# Patient Record
Sex: FEMALE | Race: OTHER
[De-identification: names, ages, dates, MRNs, and addresses within clinical notes are randomized per-mention and may not be internally consistent; named-entity substitution may affect disease eponyms.]

---

## 2017-01-06 ENCOUNTER — RECORD ABSTRACTING (OUTPATIENT)
Age: 37
End: 2017-01-06

## 2017-01-06 DIAGNOSIS — Z78.9 OTHER SPECIFIED HEALTH STATUS: ICD-10-CM

## 2017-04-12 ENCOUNTER — APPOINTMENT (OUTPATIENT)
Dept: OBGYN | Facility: CLINIC | Age: 37
End: 2017-04-12

## 2017-06-22 ENCOUNTER — EMERGENCY (EMERGENCY)
Facility: HOSPITAL | Age: 37
LOS: 0 days | Discharge: HOME | End: 2017-06-22

## 2017-06-22 DIAGNOSIS — O99.89 OTHER SPECIFIED DISEASES AND CONDITIONS COMPLICATING PREGNANCY, CHILDBIRTH AND THE PUERPERIUM: ICD-10-CM

## 2017-07-27 DIAGNOSIS — Z33.2 ENCOUNTER FOR ELECTIVE TERMINATION OF PREGNANCY: ICD-10-CM

## 2018-08-29 ENCOUNTER — APPOINTMENT (OUTPATIENT)
Dept: ENDOCRINOLOGY | Facility: CLINIC | Age: 38
End: 2018-08-29

## 2021-02-23 ENCOUNTER — NON-APPOINTMENT (OUTPATIENT)
Age: 41
End: 2021-02-23

## 2021-02-25 ENCOUNTER — OUTPATIENT (OUTPATIENT)
Dept: OUTPATIENT SERVICES | Facility: HOSPITAL | Age: 41
LOS: 1 days | Discharge: HOME | End: 2021-02-25

## 2021-02-25 ENCOUNTER — NON-APPOINTMENT (OUTPATIENT)
Age: 41
End: 2021-02-25

## 2021-02-25 ENCOUNTER — RESULT CHARGE (OUTPATIENT)
Age: 41
End: 2021-02-25

## 2021-02-25 ENCOUNTER — APPOINTMENT (OUTPATIENT)
Dept: OBGYN | Facility: CLINIC | Age: 41
End: 2021-02-25

## 2021-02-25 ENCOUNTER — APPOINTMENT (OUTPATIENT)
Dept: OBGYN | Facility: CLINIC | Age: 41
End: 2021-02-25
Payer: MEDICAID

## 2021-02-25 VITALS — DIASTOLIC BLOOD PRESSURE: 90 MMHG | WEIGHT: 151 LBS | SYSTOLIC BLOOD PRESSURE: 146 MMHG

## 2021-02-25 VITALS — HEIGHT: 63 IN | WEIGHT: 151 LBS | BODY MASS INDEX: 26.75 KG/M2

## 2021-02-25 LAB
BILIRUB UR QL STRIP: NORMAL
CLARITY UR: CLEAR
COLLECTION METHOD: NORMAL
GLUCOSE UR-MCNC: NORMAL
HCG UR QL: 0.2 EU/DL
HGB UR QL STRIP.AUTO: NORMAL
KETONES UR-MCNC: NORMAL
LEUKOCYTE ESTERASE UR QL STRIP: NORMAL
NITRITE UR QL STRIP: NORMAL
PH UR STRIP: 6
PROT UR STRIP-MCNC: NORMAL
SP GR UR STRIP: 1.02

## 2021-02-25 PROCEDURE — 76815 OB US LIMITED FETUS(S): CPT | Mod: 26

## 2021-02-25 PROCEDURE — 99204 OFFICE O/P NEW MOD 45 MIN: CPT | Mod: 25

## 2021-03-01 LAB
C TRACH RRNA SPEC QL NAA+PROBE: NOT DETECTED
N GONORRHOEA RRNA SPEC QL NAA+PROBE: NOT DETECTED
SOURCE AMPLIFICATION: NORMAL

## 2021-03-05 DIAGNOSIS — Z34.90 ENCOUNTER FOR SUPERVISION OF NORMAL PREGNANCY, UNSPECIFIED, UNSPECIFIED TRIMESTER: ICD-10-CM

## 2021-03-23 ENCOUNTER — NON-APPOINTMENT (OUTPATIENT)
Age: 41
End: 2021-03-23

## 2021-03-25 ENCOUNTER — NON-APPOINTMENT (OUTPATIENT)
Age: 41
End: 2021-03-25

## 2021-03-25 ENCOUNTER — RESULT CHARGE (OUTPATIENT)
Age: 41
End: 2021-03-25

## 2021-03-25 ENCOUNTER — OUTPATIENT (OUTPATIENT)
Dept: OUTPATIENT SERVICES | Facility: HOSPITAL | Age: 41
LOS: 1 days | Discharge: HOME | End: 2021-03-25

## 2021-03-25 ENCOUNTER — APPOINTMENT (OUTPATIENT)
Dept: OBGYN | Facility: CLINIC | Age: 41
End: 2021-03-25
Payer: MEDICAID

## 2021-03-25 VITALS — WEIGHT: 153 LBS | SYSTOLIC BLOOD PRESSURE: 136 MMHG | DIASTOLIC BLOOD PRESSURE: 84 MMHG | BODY MASS INDEX: 27.1 KG/M2

## 2021-03-25 PROCEDURE — 99213 OFFICE O/P EST LOW 20 MIN: CPT

## 2021-03-26 ENCOUNTER — OUTPATIENT (OUTPATIENT)
Dept: OUTPATIENT SERVICES | Facility: HOSPITAL | Age: 41
LOS: 1 days | Discharge: HOME | End: 2021-03-26

## 2021-03-26 ENCOUNTER — ASOB RESULT (OUTPATIENT)
Age: 41
End: 2021-03-26

## 2021-03-26 ENCOUNTER — APPOINTMENT (OUTPATIENT)
Dept: ANTEPARTUM | Facility: CLINIC | Age: 41
End: 2021-03-26
Payer: MEDICAID

## 2021-03-26 PROCEDURE — 76813 OB US NUCHAL MEAS 1 GEST: CPT | Mod: 26

## 2021-03-29 DIAGNOSIS — Z34.90 ENCOUNTER FOR SUPERVISION OF NORMAL PREGNANCY, UNSPECIFIED, UNSPECIFIED TRIMESTER: ICD-10-CM

## 2021-03-29 DIAGNOSIS — Z36.89 ENCOUNTER FOR OTHER SPECIFIED ANTENATAL SCREENING: ICD-10-CM

## 2021-03-29 DIAGNOSIS — Z36.3 ENCOUNTER FOR ANTENATAL SCREENING FOR MALFORMATIONS: ICD-10-CM

## 2021-03-29 DIAGNOSIS — Z3A.12 12 WEEKS GESTATION OF PREGNANCY: ICD-10-CM

## 2021-03-29 DIAGNOSIS — O35.9XX0 MATERNAL CARE FOR (SUSPECTED) FETAL ABNORMALITY AND DAMAGE, UNSPECIFIED, NOT APPLICABLE OR UNSPECIFIED: ICD-10-CM

## 2021-03-29 DIAGNOSIS — Z36.82 ENCOUNTER FOR ANTENATAL SCREENING FOR NUCHAL TRANSLUCENCY: ICD-10-CM

## 2021-04-23 ENCOUNTER — APPOINTMENT (OUTPATIENT)
Dept: ANTEPARTUM | Facility: CLINIC | Age: 41
End: 2021-04-23

## 2021-04-29 ENCOUNTER — NON-APPOINTMENT (OUTPATIENT)
Age: 41
End: 2021-04-29

## 2021-04-29 ENCOUNTER — RESULT CHARGE (OUTPATIENT)
Age: 41
End: 2021-04-29

## 2021-04-29 ENCOUNTER — APPOINTMENT (OUTPATIENT)
Dept: OBGYN | Facility: CLINIC | Age: 41
End: 2021-04-29
Payer: MEDICAID

## 2021-04-29 ENCOUNTER — OUTPATIENT (OUTPATIENT)
Dept: OUTPATIENT SERVICES | Facility: HOSPITAL | Age: 41
LOS: 1 days | Discharge: HOME | End: 2021-04-29

## 2021-04-29 VITALS
SYSTOLIC BLOOD PRESSURE: 100 MMHG | WEIGHT: 155 LBS | BODY MASS INDEX: 27.46 KG/M2 | HEIGHT: 63 IN | DIASTOLIC BLOOD PRESSURE: 70 MMHG

## 2021-04-29 LAB
BILIRUB UR QL STRIP: NORMAL
CLARITY UR: CLEAR
COLLECTION METHOD: NORMAL
GLUCOSE UR-MCNC: NORMAL
HCG UR QL: 0.2 EU/DL
HGB UR QL STRIP.AUTO: NORMAL
KETONES UR-MCNC: NORMAL
LEUKOCYTE ESTERASE UR QL STRIP: NORMAL
NITRITE UR QL STRIP: NORMAL
PH UR STRIP: 8
PROT UR STRIP-MCNC: NORMAL
SP GR UR STRIP: 1.01

## 2021-04-29 PROCEDURE — 99213 OFFICE O/P EST LOW 20 MIN: CPT

## 2021-05-20 ENCOUNTER — OUTPATIENT (OUTPATIENT)
Dept: OUTPATIENT SERVICES | Facility: HOSPITAL | Age: 41
LOS: 1 days | Discharge: HOME | End: 2021-05-20

## 2021-05-20 ENCOUNTER — ASOB RESULT (OUTPATIENT)
Age: 41
End: 2021-05-20

## 2021-05-20 ENCOUNTER — APPOINTMENT (OUTPATIENT)
Dept: ANTEPARTUM | Facility: CLINIC | Age: 41
End: 2021-05-20
Payer: MEDICAID

## 2021-05-20 PROCEDURE — 76817 TRANSVAGINAL US OBSTETRIC: CPT | Mod: 26

## 2021-05-20 PROCEDURE — 76811 OB US DETAILED SNGL FETUS: CPT | Mod: 26

## 2021-05-27 ENCOUNTER — RESULT CHARGE (OUTPATIENT)
Age: 41
End: 2021-05-27

## 2021-05-27 ENCOUNTER — APPOINTMENT (OUTPATIENT)
Dept: OBGYN | Facility: CLINIC | Age: 41
End: 2021-05-27
Payer: MEDICAID

## 2021-05-27 ENCOUNTER — NON-APPOINTMENT (OUTPATIENT)
Age: 41
End: 2021-05-27

## 2021-05-27 ENCOUNTER — OUTPATIENT (OUTPATIENT)
Dept: OUTPATIENT SERVICES | Facility: HOSPITAL | Age: 41
LOS: 1 days | Discharge: HOME | End: 2021-05-27

## 2021-05-27 VITALS
SYSTOLIC BLOOD PRESSURE: 100 MMHG | HEIGHT: 63 IN | WEIGHT: 163.03 LBS | DIASTOLIC BLOOD PRESSURE: 60 MMHG | BODY MASS INDEX: 28.89 KG/M2

## 2021-05-27 LAB
BILIRUB UR QL STRIP: NORMAL
CLARITY UR: CLEAR
COLLECTION METHOD: NORMAL
GLUCOSE UR-MCNC: NORMAL
HCG UR QL: 0.2 EU/DL
HGB UR QL STRIP.AUTO: NORMAL
KETONES UR-MCNC: NORMAL
LEUKOCYTE ESTERASE UR QL STRIP: NORMAL
NITRITE UR QL STRIP: NORMAL
PH UR STRIP: 6
PROT UR STRIP-MCNC: NORMAL
SP GR UR STRIP: 1.01

## 2021-05-27 PROCEDURE — 99213 OFFICE O/P EST LOW 20 MIN: CPT

## 2021-06-24 ENCOUNTER — APPOINTMENT (OUTPATIENT)
Dept: OBGYN | Facility: CLINIC | Age: 41
End: 2021-06-24

## 2021-07-12 ENCOUNTER — APPOINTMENT (OUTPATIENT)
Dept: OBGYN | Facility: CLINIC | Age: 41
End: 2021-07-12

## 2021-07-22 ENCOUNTER — NON-APPOINTMENT (OUTPATIENT)
Age: 41
End: 2021-07-22

## 2021-07-22 ENCOUNTER — APPOINTMENT (OUTPATIENT)
Dept: OBGYN | Facility: CLINIC | Age: 41
End: 2021-07-22
Payer: MEDICAID

## 2021-07-22 ENCOUNTER — OUTPATIENT (OUTPATIENT)
Dept: OUTPATIENT SERVICES | Facility: HOSPITAL | Age: 41
LOS: 1 days | Discharge: HOME | End: 2021-07-22

## 2021-07-22 ENCOUNTER — RESULT CHARGE (OUTPATIENT)
Age: 41
End: 2021-07-22

## 2021-07-22 VITALS
BODY MASS INDEX: 30.12 KG/M2 | WEIGHT: 170.03 LBS | HEIGHT: 63 IN | DIASTOLIC BLOOD PRESSURE: 72 MMHG | SYSTOLIC BLOOD PRESSURE: 120 MMHG

## 2021-07-22 PROCEDURE — 99213 OFFICE O/P EST LOW 20 MIN: CPT

## 2021-07-26 LAB
ABO + RH PNL BLD: NORMAL
BASOPHILS # BLD AUTO: 0.02 K/UL
BASOPHILS NFR BLD AUTO: 0.2 %
BLD GP AB SCN SERPL QL: NORMAL
EOSINOPHIL # BLD AUTO: 0.13 K/UL
EOSINOPHIL NFR BLD AUTO: 1.6 %
GLUCOSE 1H P 50 G GLC PO SERPL-MCNC: 107 MG/DL
HCT VFR BLD CALC: 35.9 %
HGB BLD-MCNC: 12 G/DL
IMM GRANULOCYTES NFR BLD AUTO: 0.7 %
LYMPHOCYTES # BLD AUTO: 1.84 K/UL
LYMPHOCYTES NFR BLD AUTO: 22.8 %
MAN DIFF?: NORMAL
MCHC RBC-ENTMCNC: 29.3 PG
MCHC RBC-ENTMCNC: 33.4 G/DL
MCV RBC AUTO: 87.8 FL
MONOCYTES # BLD AUTO: 0.6 K/UL
MONOCYTES NFR BLD AUTO: 7.4 %
NEUTROPHILS # BLD AUTO: 5.42 K/UL
NEUTROPHILS NFR BLD AUTO: 67.3 %
PLATELET # BLD AUTO: 158 K/UL
RBC # BLD: 4.09 M/UL
RBC # FLD: 13.4 %
WBC # FLD AUTO: 8.07 K/UL

## 2021-08-05 ENCOUNTER — NON-APPOINTMENT (OUTPATIENT)
Age: 41
End: 2021-08-05

## 2021-08-05 ENCOUNTER — APPOINTMENT (OUTPATIENT)
Dept: OBGYN | Facility: CLINIC | Age: 41
End: 2021-08-05

## 2021-08-12 ENCOUNTER — APPOINTMENT (OUTPATIENT)
Dept: OBGYN | Facility: CLINIC | Age: 41
End: 2021-08-12
Payer: MEDICAID

## 2021-08-12 ENCOUNTER — OUTPATIENT (OUTPATIENT)
Dept: OUTPATIENT SERVICES | Facility: HOSPITAL | Age: 41
LOS: 1 days | Discharge: HOME | End: 2021-08-12

## 2021-08-12 VITALS — SYSTOLIC BLOOD PRESSURE: 130 MMHG | DIASTOLIC BLOOD PRESSURE: 80 MMHG | WEIGHT: 178 LBS | BODY MASS INDEX: 31.53 KG/M2

## 2021-08-12 LAB
BILIRUB UR QL STRIP: NORMAL
CLARITY UR: CLEAR
COLLECTION METHOD: NORMAL
GLUCOSE UR-MCNC: NORMAL
HCG UR QL: 0.2 EU/DL
HGB UR QL STRIP.AUTO: NORMAL
KETONES UR-MCNC: NORMAL
LEUKOCYTE ESTERASE UR QL STRIP: NORMAL
NITRITE UR QL STRIP: NORMAL
PH UR STRIP: 6.5
PROT UR STRIP-MCNC: NORMAL
SP GR UR STRIP: 1.01

## 2021-08-12 PROCEDURE — 99213 OFFICE O/P EST LOW 20 MIN: CPT

## 2021-08-16 ENCOUNTER — OUTPATIENT (OUTPATIENT)
Dept: OUTPATIENT SERVICES | Facility: HOSPITAL | Age: 41
LOS: 1 days | Discharge: HOME | End: 2021-08-16

## 2021-08-16 ENCOUNTER — ASOB RESULT (OUTPATIENT)
Age: 41
End: 2021-08-16

## 2021-08-16 ENCOUNTER — APPOINTMENT (OUTPATIENT)
Dept: ANTEPARTUM | Facility: CLINIC | Age: 41
End: 2021-08-16
Payer: MEDICAID

## 2021-08-16 PROCEDURE — 76819 FETAL BIOPHYS PROFIL W/O NST: CPT | Mod: 26

## 2021-08-16 PROCEDURE — 76816 OB US FOLLOW-UP PER FETUS: CPT | Mod: 26

## 2021-08-17 DIAGNOSIS — Z36.89 ENCOUNTER FOR OTHER SPECIFIED ANTENATAL SCREENING: ICD-10-CM

## 2021-08-17 DIAGNOSIS — Z3A.33 33 WEEKS GESTATION OF PREGNANCY: ICD-10-CM

## 2021-08-17 DIAGNOSIS — O26.849 UTERINE SIZE-DATE DISCREPANCY, UNSPECIFIED TRIMESTER: ICD-10-CM

## 2021-08-19 ENCOUNTER — APPOINTMENT (OUTPATIENT)
Dept: OBGYN | Facility: CLINIC | Age: 41
End: 2021-08-19

## 2021-08-19 ENCOUNTER — NON-APPOINTMENT (OUTPATIENT)
Age: 41
End: 2021-08-19

## 2021-09-02 ENCOUNTER — RESULT CHARGE (OUTPATIENT)
Age: 41
End: 2021-09-02

## 2021-09-02 ENCOUNTER — OUTPATIENT (OUTPATIENT)
Dept: OUTPATIENT SERVICES | Facility: HOSPITAL | Age: 41
LOS: 1 days | Discharge: HOME | End: 2021-09-02

## 2021-09-02 ENCOUNTER — APPOINTMENT (OUTPATIENT)
Dept: OBGYN | Facility: CLINIC | Age: 41
End: 2021-09-02
Payer: MEDICAID

## 2021-09-02 VITALS
SYSTOLIC BLOOD PRESSURE: 112 MMHG | DIASTOLIC BLOOD PRESSURE: 68 MMHG | BODY MASS INDEX: 31.97 KG/M2 | WEIGHT: 180.44 LBS | HEIGHT: 63 IN

## 2021-09-02 PROCEDURE — 99213 OFFICE O/P EST LOW 20 MIN: CPT

## 2021-09-08 ENCOUNTER — LABORATORY RESULT (OUTPATIENT)
Age: 41
End: 2021-09-08

## 2021-09-08 ENCOUNTER — NON-APPOINTMENT (OUTPATIENT)
Age: 41
End: 2021-09-08

## 2021-09-08 DIAGNOSIS — Z34.90 ENCOUNTER FOR SUPERVISION OF NORMAL PREGNANCY, UNSPECIFIED, UNSPECIFIED TRIMESTER: ICD-10-CM

## 2021-09-09 ENCOUNTER — NON-APPOINTMENT (OUTPATIENT)
Age: 41
End: 2021-09-09

## 2021-09-09 ENCOUNTER — APPOINTMENT (OUTPATIENT)
Dept: OBGYN | Facility: CLINIC | Age: 41
End: 2021-09-09
Payer: MEDICAID

## 2021-09-09 ENCOUNTER — OUTPATIENT (OUTPATIENT)
Dept: OUTPATIENT SERVICES | Facility: HOSPITAL | Age: 41
LOS: 1 days | Discharge: HOME | End: 2021-09-09

## 2021-09-09 ENCOUNTER — RESULT CHARGE (OUTPATIENT)
Age: 41
End: 2021-09-09

## 2021-09-09 VITALS
DIASTOLIC BLOOD PRESSURE: 80 MMHG | HEIGHT: 63 IN | WEIGHT: 183 LBS | BODY MASS INDEX: 32.43 KG/M2 | SYSTOLIC BLOOD PRESSURE: 120 MMHG

## 2021-09-09 VITALS
BODY MASS INDEX: 32.43 KG/M2 | WEIGHT: 183 LBS | SYSTOLIC BLOOD PRESSURE: 120 MMHG | HEIGHT: 63 IN | DIASTOLIC BLOOD PRESSURE: 80 MMHG

## 2021-09-09 DIAGNOSIS — Z34.90 ENCOUNTER FOR SUPERVISION OF NORMAL PREGNANCY, UNSPECIFIED, UNSPECIFIED TRIMESTER: ICD-10-CM

## 2021-09-09 LAB
BASOPHILS # BLD AUTO: 0.02 K/UL
BASOPHILS NFR BLD AUTO: 0.3 %
BILIRUB UR QL STRIP: NORMAL
CLARITY UR: CLEAR
COLLECTION METHOD: NORMAL
EOSINOPHIL # BLD AUTO: 0.05 K/UL
EOSINOPHIL NFR BLD AUTO: 0.7 %
GLUCOSE UR-MCNC: NORMAL
HCG UR QL: 0.2 EU/DL
HCT VFR BLD CALC: 40.9 %
HGB BLD-MCNC: 13.6 G/DL
HGB UR QL STRIP.AUTO: NORMAL
HIV1+2 AB SPEC QL IA.RAPID: NONREACTIVE
IMM GRANULOCYTES NFR BLD AUTO: 0.4 %
KETONES UR-MCNC: NORMAL
LEUKOCYTE ESTERASE UR QL STRIP: NORMAL
LYMPHOCYTES # BLD AUTO: 2.34 K/UL
LYMPHOCYTES NFR BLD AUTO: 33.1 %
MAN DIFF?: NORMAL
MCHC RBC-ENTMCNC: 29.2 PG
MCHC RBC-ENTMCNC: 33.3 G/DL
MCV RBC AUTO: 88 FL
MEV IGG FLD QL IA: 50.9 AU/ML
MEV IGG+IGM SER-IMP: POSITIVE
MONOCYTES # BLD AUTO: 0.56 K/UL
MONOCYTES NFR BLD AUTO: 7.9 %
MUV AB SER-ACNC: POSITIVE
MUV IGG SER QL IA: 173 AU/ML
NEUTROPHILS # BLD AUTO: 4.07 K/UL
NEUTROPHILS NFR BLD AUTO: 57.6 %
NITRITE UR QL STRIP: NORMAL
PH UR STRIP: 6
PLATELET # BLD AUTO: 191 K/UL
PROT UR STRIP-MCNC: NORMAL
RBC # BLD: 4.65 M/UL
RBC # FLD: 13.2 %
RUBV IGG FLD-ACNC: 30.3 INDEX
RUBV IGG SER-IMP: POSITIVE
SIUH URINE DRUG SCREEN 1: NORMAL
SP GR UR STRIP: 1.01
T PALLIDUM AB SER QL IA: NEGATIVE
WBC # FLD AUTO: 7.07 K/UL

## 2021-09-09 PROCEDURE — 99213 OFFICE O/P EST LOW 20 MIN: CPT

## 2021-09-12 LAB
GP B STREP DNA SPEC QL NAA+PROBE: NORMAL
GP B STREP DNA SPEC QL NAA+PROBE: NOT DETECTED
SOURCE GBS: NORMAL

## 2021-09-14 ENCOUNTER — APPOINTMENT (OUTPATIENT)
Dept: ANTEPARTUM | Facility: CLINIC | Age: 41
End: 2021-09-14

## 2021-09-16 ENCOUNTER — APPOINTMENT (OUTPATIENT)
Dept: OBGYN | Facility: CLINIC | Age: 41
End: 2021-09-16

## 2021-09-21 ENCOUNTER — ASOB RESULT (OUTPATIENT)
Age: 41
End: 2021-09-21

## 2021-09-21 ENCOUNTER — OUTPATIENT (OUTPATIENT)
Dept: OUTPATIENT SERVICES | Facility: HOSPITAL | Age: 41
LOS: 1 days | Discharge: HOME | End: 2021-09-21

## 2021-09-21 ENCOUNTER — APPOINTMENT (OUTPATIENT)
Dept: ANTEPARTUM | Facility: CLINIC | Age: 41
End: 2021-09-21
Payer: MEDICAID

## 2021-09-21 PROCEDURE — 76816 OB US FOLLOW-UP PER FETUS: CPT | Mod: 26

## 2021-09-21 PROCEDURE — 76819 FETAL BIOPHYS PROFIL W/O NST: CPT | Mod: 26

## 2021-09-22 DIAGNOSIS — O26.849 UTERINE SIZE-DATE DISCREPANCY, UNSPECIFIED TRIMESTER: ICD-10-CM

## 2021-09-22 DIAGNOSIS — Z3A.38 38 WEEKS GESTATION OF PREGNANCY: ICD-10-CM

## 2021-09-22 DIAGNOSIS — O09.529 SUPERVISION OF ELDERLY MULTIGRAVIDA, UNSPECIFIED TRIMESTER: ICD-10-CM

## 2021-09-22 DIAGNOSIS — Z36.89 ENCOUNTER FOR OTHER SPECIFIED ANTENATAL SCREENING: ICD-10-CM

## 2021-09-23 ENCOUNTER — APPOINTMENT (OUTPATIENT)
Dept: OBGYN | Facility: CLINIC | Age: 41
End: 2021-09-23
Payer: MEDICAID

## 2021-09-23 ENCOUNTER — OUTPATIENT (OUTPATIENT)
Dept: OUTPATIENT SERVICES | Facility: HOSPITAL | Age: 41
LOS: 1 days | Discharge: HOME | End: 2021-09-23

## 2021-09-23 ENCOUNTER — NON-APPOINTMENT (OUTPATIENT)
Age: 41
End: 2021-09-23

## 2021-09-23 ENCOUNTER — INPATIENT (INPATIENT)
Facility: HOSPITAL | Age: 41
LOS: 2 days | Discharge: HOME HEALTH | End: 2021-09-26
Attending: OBSTETRICS & GYNECOLOGY | Admitting: OBSTETRICS & GYNECOLOGY
Payer: MEDICAID

## 2021-09-23 ENCOUNTER — RESULT CHARGE (OUTPATIENT)
Age: 41
End: 2021-09-23

## 2021-09-23 VITALS — HEART RATE: 77 BPM | SYSTOLIC BLOOD PRESSURE: 142 MMHG | DIASTOLIC BLOOD PRESSURE: 95 MMHG

## 2021-09-23 VITALS
SYSTOLIC BLOOD PRESSURE: 167 MMHG | HEIGHT: 63 IN | DIASTOLIC BLOOD PRESSURE: 92 MMHG | WEIGHT: 188 LBS | BODY MASS INDEX: 33.31 KG/M2

## 2021-09-23 LAB
ALBUMIN SERPL ELPH-MCNC: 3.2 G/DL — LOW (ref 3.5–5.2)
ALP SERPL-CCNC: 125 U/L — HIGH (ref 30–115)
ALT FLD-CCNC: 8 U/L — SIGNIFICANT CHANGE UP (ref 0–41)
ANION GAP SERPL CALC-SCNC: 12 MMOL/L — SIGNIFICANT CHANGE UP (ref 7–14)
APPEARANCE UR: CLEAR — SIGNIFICANT CHANGE UP
AST SERPL-CCNC: 19 U/L — SIGNIFICANT CHANGE UP (ref 0–41)
BASOPHILS # BLD AUTO: 0.02 K/UL — SIGNIFICANT CHANGE UP (ref 0–0.2)
BASOPHILS NFR BLD AUTO: 0.2 % — SIGNIFICANT CHANGE UP (ref 0–1)
BILIRUB SERPL-MCNC: 0.4 MG/DL — SIGNIFICANT CHANGE UP (ref 0.2–1.2)
BILIRUB UR QL STRIP: NORMAL
BILIRUB UR-MCNC: NEGATIVE — SIGNIFICANT CHANGE UP
BLD GP AB SCN SERPL QL: SIGNIFICANT CHANGE UP
BUN SERPL-MCNC: 6 MG/DL — LOW (ref 10–20)
CALCIUM SERPL-MCNC: 9.5 MG/DL — SIGNIFICANT CHANGE UP (ref 8.5–10.1)
CHLORIDE SERPL-SCNC: 105 MMOL/L — SIGNIFICANT CHANGE UP (ref 98–110)
CLARITY UR: CLEAR
CO2 SERPL-SCNC: 19 MMOL/L — SIGNIFICANT CHANGE UP (ref 17–32)
COLLECTION METHOD: NORMAL
COLOR SPEC: SIGNIFICANT CHANGE UP
CREAT ?TM UR-MCNC: 57 MG/DL — SIGNIFICANT CHANGE UP
CREAT SERPL-MCNC: 0.5 MG/DL — LOW (ref 0.7–1.5)
DIFF PNL FLD: NEGATIVE — SIGNIFICANT CHANGE UP
EOSINOPHIL # BLD AUTO: 0.04 K/UL — SIGNIFICANT CHANGE UP (ref 0–0.7)
EOSINOPHIL NFR BLD AUTO: 0.5 % — SIGNIFICANT CHANGE UP (ref 0–8)
GLUCOSE SERPL-MCNC: 74 MG/DL — SIGNIFICANT CHANGE UP (ref 70–99)
GLUCOSE UR QL: NEGATIVE — SIGNIFICANT CHANGE UP
GLUCOSE UR-MCNC: NORMAL
HCG UR QL: 0.2 EU/DL
HCT VFR BLD CALC: 39.9 % — SIGNIFICANT CHANGE UP (ref 37–47)
HGB BLD-MCNC: 13.4 G/DL — SIGNIFICANT CHANGE UP (ref 12–16)
HGB UR QL STRIP.AUTO: NORMAL
IMM GRANULOCYTES NFR BLD AUTO: 0.5 % — HIGH (ref 0.1–0.3)
KETONES UR-MCNC: NEGATIVE — SIGNIFICANT CHANGE UP
KETONES UR-MCNC: NORMAL
LDH SERPL L TO P-CCNC: 205 — SIGNIFICANT CHANGE UP (ref 50–242)
LEUKOCYTE ESTERASE UR QL STRIP: NORMAL
LEUKOCYTE ESTERASE UR-ACNC: NEGATIVE — SIGNIFICANT CHANGE UP
LYMPHOCYTES # BLD AUTO: 2.12 K/UL — SIGNIFICANT CHANGE UP (ref 1.2–3.4)
LYMPHOCYTES # BLD AUTO: 25.9 % — SIGNIFICANT CHANGE UP (ref 20.5–51.1)
MCHC RBC-ENTMCNC: 28.6 PG — SIGNIFICANT CHANGE UP (ref 27–31)
MCHC RBC-ENTMCNC: 33.6 G/DL — SIGNIFICANT CHANGE UP (ref 32–37)
MCV RBC AUTO: 85.3 FL — SIGNIFICANT CHANGE UP (ref 81–99)
MONOCYTES # BLD AUTO: 0.66 K/UL — HIGH (ref 0.1–0.6)
MONOCYTES NFR BLD AUTO: 8 % — SIGNIFICANT CHANGE UP (ref 1.7–9.3)
NEUTROPHILS # BLD AUTO: 5.32 K/UL — SIGNIFICANT CHANGE UP (ref 1.4–6.5)
NEUTROPHILS NFR BLD AUTO: 64.9 % — SIGNIFICANT CHANGE UP (ref 42.2–75.2)
NITRITE UR QL STRIP: NORMAL
NITRITE UR-MCNC: NEGATIVE — SIGNIFICANT CHANGE UP
NRBC # BLD: 0 /100 WBCS — SIGNIFICANT CHANGE UP (ref 0–0)
PH UR STRIP: 6
PH UR: 7 — SIGNIFICANT CHANGE UP (ref 5–8)
PLATELET # BLD AUTO: 180 K/UL — SIGNIFICANT CHANGE UP (ref 130–400)
POTASSIUM SERPL-MCNC: 4.1 MMOL/L — SIGNIFICANT CHANGE UP (ref 3.5–5)
POTASSIUM SERPL-SCNC: 4.1 MMOL/L — SIGNIFICANT CHANGE UP (ref 3.5–5)
PRENATAL SYPHILIS TEST: SIGNIFICANT CHANGE UP
PROT ?TM UR-MCNC: 16 MG/DLG/24H — SIGNIFICANT CHANGE UP
PROT SERPL-MCNC: 5.9 G/DL — LOW (ref 6–8)
PROT UR STRIP-MCNC: NORMAL
PROT UR-MCNC: SIGNIFICANT CHANGE UP
PROT/CREAT UR-RTO: 0.3 RATIO — HIGH (ref 0–0.2)
RBC # BLD: 4.68 M/UL — SIGNIFICANT CHANGE UP (ref 4.2–5.4)
RBC # FLD: 13.1 % — SIGNIFICANT CHANGE UP (ref 11.5–14.5)
SARS-COV-2 RNA SPEC QL NAA+PROBE: SIGNIFICANT CHANGE UP
SODIUM SERPL-SCNC: 136 MMOL/L — SIGNIFICANT CHANGE UP (ref 135–146)
SP GR SPEC: 1.01 — SIGNIFICANT CHANGE UP (ref 1.01–1.03)
SP GR UR STRIP: 1.01
URATE SERPL-MCNC: 4.5 MG/DL — SIGNIFICANT CHANGE UP (ref 2.5–7)
UROBILINOGEN FLD QL: SIGNIFICANT CHANGE UP
WBC # BLD: 8.2 K/UL — SIGNIFICANT CHANGE UP (ref 4.8–10.8)
WBC # FLD AUTO: 8.2 K/UL — SIGNIFICANT CHANGE UP (ref 4.8–10.8)

## 2021-09-23 PROCEDURE — 99222 1ST HOSP IP/OBS MODERATE 55: CPT | Mod: 25

## 2021-09-23 PROCEDURE — 99213 OFFICE O/P EST LOW 20 MIN: CPT

## 2021-09-23 PROCEDURE — 59025 FETAL NON-STRESS TEST: CPT | Mod: 26

## 2021-09-23 RX ORDER — OXYTOCIN 10 UNIT/ML
333.33 VIAL (ML) INJECTION
Qty: 20 | Refills: 0 | Status: DISCONTINUED | OUTPATIENT
Start: 2021-09-23 | End: 2021-09-26

## 2021-09-23 RX ORDER — SODIUM CHLORIDE 9 MG/ML
1000 INJECTION, SOLUTION INTRAVENOUS
Refills: 0 | Status: DISCONTINUED | OUTPATIENT
Start: 2021-09-23 | End: 2021-09-25

## 2021-09-23 RX ORDER — OXYTOCIN 10 UNIT/ML
2 VIAL (ML) INJECTION
Qty: 30 | Refills: 0 | Status: DISCONTINUED | OUTPATIENT
Start: 2021-09-23 | End: 2021-09-26

## 2021-09-23 RX ADMIN — SODIUM CHLORIDE 125 MILLILITER(S): 9 INJECTION, SOLUTION INTRAVENOUS at 17:36

## 2021-09-23 RX ADMIN — Medication 2 MILLIUNIT(S)/MIN: at 17:36

## 2021-09-23 NOTE — OB PROVIDER H&P - ASSESSMENT
29yo  at 38w3d GA, GBS neg, CF carrier, with gHTN, r/o preeclampsia w/o severe features, for IOL    - admit to L&D  - f/u admission labs   - pitocin for IOL  - cont EFM and toco  - pain management PRN  - IV hydration, clear liquid diet  - monitor vitals    Dr. Calles aware.

## 2021-09-23 NOTE — OB PROVIDER H&P - HISTORY OF PRESENT ILLNESS
31yo  at 38w3d GA, EDC 10/4/21 by LMP, presenting from clinic for severely elevated BP of 167/92. Currently denies HA, blurry vision, CP, SOB, N/V, abdominal pain, urinary symptoms, or LE swelling/pain. CF carrier, FOB not tested. Denies any complications with this pregnancy. GBS negative.

## 2021-09-23 NOTE — OB PROVIDER TRIAGE NOTE - NSHPPHYSICALEXAM_GEN_ALL_CORE
Vital Signs Last 24 Hrs  T(C): --  T(F): --  HR: 77 (23 Sep 2021 11:58) (77 - 77)  BP: 142/95 (23 Sep 2021 11:58) (142/95 - 142/95)    Gen: AOx3, no acute distress  CVS: RRR  Lungs: CTABL  Abd: soft, gravid, non tender, no palpable contractions, no RUQ/epigastric tenderness  Ext: No edema bilaterally  Neuro: reflexes 2+ in UE bilaterally    EFM: 130/mod/+accels; cat 1  [place]: occasional  SVE: deferred, 1/L/-3 in PMD office Vital Signs Last 24 Hrs  T(C): --  T(F): --  HR: 77 (23 Sep 2021 11:58) (77 - 77)  BP: 142/95 (23 Sep 2021 11:58) (142/95 - 142/95)    Gen: AOx3, no acute distress  CVS: RRR  Lungs: CTABL  Abd: soft, gravid, non tender, no palpable contractions, no RUQ/epigastric tenderness  Ext: No edema bilaterally  Neuro: reflexes 2+ in UE bilaterally    EFM: 130/mod/+accels; cat 1  [place]: occasional  SVE: deferred, 1/L/-3 in PMD office  BSS: cephalic, post placenta, MVP 5.9cm, BPP 8/8

## 2021-09-23 NOTE — OB PROVIDER H&P - NSHPPHYSICALEXAM_GEN_ALL_CORE
Vital Signs Last 24 Hrs  T(C): 36.7 (23 Sep 2021 12:17), Max: 36.7 (23 Sep 2021 12:17)  T(F): 98 (23 Sep 2021 12:17), Max: 98 (23 Sep 2021 12:17)  HR: 83 (23 Sep 2021 16:58) (68 - 90)  BP: 152/95 (23 Sep 2021 16:58) (127/84 - 158/96)  RR: 18 (23 Sep 2021 12:17) (18 - 18)      Gen: AOx3, no acute distress  CVS: RRR  Lungs: CTABL  Abd: soft, gravid, non tender, no palpable contractions, no RUQ/epigastric tenderness  Ext: No edema bilaterally  Neuro: reflexes 2+ in UE bilaterally    EFM: 130/mod/+accels; cat 1  [place]: occasional  SVE: deferred, 1/L/-3 in PMD office  BSS: cephalic, post placenta, MVP 5.9cm, BPP 8/8

## 2021-09-23 NOTE — OB RN DELIVERY SUMMARY - NSSELHIDDEN_OBGYN_ALL_OB_FT
[[email or web address]_OB_FT:[email or web address]_OB_FT:[id number]],[[email or web address]_ALL_OB_FT:[id number]]

## 2021-09-23 NOTE — OB PROVIDER TRIAGE NOTE - HISTORY OF PRESENT ILLNESS
31yo  at 38w3d GA, EDC 10/4/21 by LMP, presenting from clinic for severely elevated BP of 167/92. Currently denies HA, blurry vision, CP< SOB, N/V, abdominal pain, urinary symptoms, or LE swelling/pain. GBS negative. 31yo  at 38w3d GA, EDC 10/4/21 by LMP, presenting from clinic for severely elevated BP of 167/92. Currently denies HA, blurry vision, CP, SOB, N/V, abdominal pain, urinary symptoms, or LE swelling/pain. CF carrier. Denies any complications with this pregnancy. GBS negative.

## 2021-09-23 NOTE — OB PROVIDER TRIAGE NOTE - NSHPLABSRESULTS_GEN_ALL_CORE
9/9/21  GBS neg  GC/Cl neg    9/8/21  HIV NR  RPR NR  mealsles immune  mumps immune  rubella immune    7/26/21    O pos, ab screen neg    SONOGRAMS:  38w1d: cephalic, post placenta, MVP 6.44cm, BPP 8/8, EFW 3465g (69%)  33w0d: cephalic, post placenta, MVP 5.2cm, BPP 8/8, EFW 2446g (85%)  20w3d: breech, post placenta no previa, MVP 5.4cm, EFW 391g, no fetal malformations  12w4d: IUP, +FH

## 2021-09-23 NOTE — OB PROVIDER TRIAGE NOTE - NSOBPROVIDERNOTE_OBGYN_ALL_OB_FT
A/P: 31yo  at 38w3d GA, GBS neg, CF carrier, for blood pressure monitoring, r/o gHTN vs preeclampsia  - f/u preeclamptic labs, UA, Uprcr  - cont EFM/toco  - BPs q15min  - continue to observe    Dr. Calles to be made aware.

## 2021-09-24 DIAGNOSIS — Z02.9 ENCOUNTER FOR ADMINISTRATIVE EXAMINATIONS, UNSPECIFIED: ICD-10-CM

## 2021-09-24 LAB
ALBUMIN SERPL ELPH-MCNC: 2.8 G/DL — LOW (ref 3.5–5.2)
ALP SERPL-CCNC: 107 U/L — SIGNIFICANT CHANGE UP (ref 30–115)
ALT FLD-CCNC: 8 U/L — SIGNIFICANT CHANGE UP (ref 0–41)
AMPHET UR-MCNC: NEGATIVE — SIGNIFICANT CHANGE UP
ANION GAP SERPL CALC-SCNC: 11 MMOL/L — SIGNIFICANT CHANGE UP (ref 7–14)
AST SERPL-CCNC: 22 U/L — SIGNIFICANT CHANGE UP (ref 0–41)
BARBITURATES UR SCN-MCNC: NEGATIVE — SIGNIFICANT CHANGE UP
BASOPHILS # BLD AUTO: 0.02 K/UL — SIGNIFICANT CHANGE UP (ref 0–0.2)
BASOPHILS NFR BLD AUTO: 0.2 % — SIGNIFICANT CHANGE UP (ref 0–1)
BENZODIAZ UR-MCNC: NEGATIVE — SIGNIFICANT CHANGE UP
BILIRUB SERPL-MCNC: 0.3 MG/DL — SIGNIFICANT CHANGE UP (ref 0.2–1.2)
BUN SERPL-MCNC: 8 MG/DL — LOW (ref 10–20)
BUPRENORPHINE SCREEN, URINE RESULT: NEGATIVE — SIGNIFICANT CHANGE UP
CALCIUM SERPL-MCNC: 8 MG/DL — LOW (ref 8.5–10.1)
CHLORIDE SERPL-SCNC: 105 MMOL/L — SIGNIFICANT CHANGE UP (ref 98–110)
CO2 SERPL-SCNC: 20 MMOL/L — SIGNIFICANT CHANGE UP (ref 17–32)
COCAINE METAB.OTHER UR-MCNC: NEGATIVE — SIGNIFICANT CHANGE UP
CREAT SERPL-MCNC: 0.6 MG/DL — LOW (ref 0.7–1.5)
EOSINOPHIL # BLD AUTO: 0.01 K/UL — SIGNIFICANT CHANGE UP (ref 0–0.7)
EOSINOPHIL NFR BLD AUTO: 0.1 % — SIGNIFICANT CHANGE UP (ref 0–8)
FENTANYL UR QL: NEGATIVE — SIGNIFICANT CHANGE UP
GLUCOSE SERPL-MCNC: 173 MG/DL — HIGH (ref 70–99)
HCT VFR BLD CALC: 35.2 % — LOW (ref 37–47)
HGB BLD-MCNC: 11.9 G/DL — LOW (ref 12–16)
IMM GRANULOCYTES NFR BLD AUTO: 0.5 % — HIGH (ref 0.1–0.3)
L&D DRUG SCREEN, URINE: SIGNIFICANT CHANGE UP
LDH SERPL L TO P-CCNC: 217 — SIGNIFICANT CHANGE UP (ref 50–242)
LYMPHOCYTES # BLD AUTO: 17.9 % — LOW (ref 20.5–51.1)
LYMPHOCYTES # BLD AUTO: 2.17 K/UL — SIGNIFICANT CHANGE UP (ref 1.2–3.4)
MAGNESIUM SERPL-MCNC: 4 MG/DL — CRITICAL HIGH (ref 1.8–2.4)
MCHC RBC-ENTMCNC: 28.7 PG — SIGNIFICANT CHANGE UP (ref 27–31)
MCHC RBC-ENTMCNC: 33.8 G/DL — SIGNIFICANT CHANGE UP (ref 32–37)
MCV RBC AUTO: 84.8 FL — SIGNIFICANT CHANGE UP (ref 81–99)
METHADONE UR-MCNC: NEGATIVE — SIGNIFICANT CHANGE UP
MONOCYTES # BLD AUTO: 0.95 K/UL — HIGH (ref 0.1–0.6)
MONOCYTES NFR BLD AUTO: 7.8 % — SIGNIFICANT CHANGE UP (ref 1.7–9.3)
NEUTROPHILS # BLD AUTO: 8.91 K/UL — HIGH (ref 1.4–6.5)
NEUTROPHILS NFR BLD AUTO: 73.5 % — SIGNIFICANT CHANGE UP (ref 42.2–75.2)
NRBC # BLD: 0 /100 WBCS — SIGNIFICANT CHANGE UP (ref 0–0)
OPIATES UR-MCNC: NEGATIVE — SIGNIFICANT CHANGE UP
OXYCODONE UR-MCNC: NEGATIVE — SIGNIFICANT CHANGE UP
PCP UR-MCNC: NEGATIVE — SIGNIFICANT CHANGE UP
PLATELET # BLD AUTO: 176 K/UL — SIGNIFICANT CHANGE UP (ref 130–400)
POTASSIUM SERPL-MCNC: 3.7 MMOL/L — SIGNIFICANT CHANGE UP (ref 3.5–5)
POTASSIUM SERPL-SCNC: 3.7 MMOL/L — SIGNIFICANT CHANGE UP (ref 3.5–5)
PROPOXYPHENE QUALITATIVE URINE RESULT: NEGATIVE — SIGNIFICANT CHANGE UP
PROT SERPL-MCNC: 4.9 G/DL — LOW (ref 6–8)
RBC # BLD: 4.15 M/UL — LOW (ref 4.2–5.4)
RBC # FLD: 12.9 % — SIGNIFICANT CHANGE UP (ref 11.5–14.5)
SODIUM SERPL-SCNC: 136 MMOL/L — SIGNIFICANT CHANGE UP (ref 135–146)
URATE SERPL-MCNC: 4 MG/DL — SIGNIFICANT CHANGE UP (ref 2.5–7)
WBC # BLD: 12.12 K/UL — HIGH (ref 4.8–10.8)
WBC # FLD AUTO: 12.12 K/UL — HIGH (ref 4.8–10.8)

## 2021-09-24 PROCEDURE — 59409 OBSTETRICAL CARE: CPT | Mod: U7

## 2021-09-24 RX ORDER — OXYCODONE HYDROCHLORIDE 5 MG/1
5 TABLET ORAL ONCE
Refills: 0 | Status: DISCONTINUED | OUTPATIENT
Start: 2021-09-24 | End: 2021-09-26

## 2021-09-24 RX ORDER — HYDRALAZINE HCL 50 MG
5 TABLET ORAL ONCE
Refills: 0 | Status: COMPLETED | OUTPATIENT
Start: 2021-09-24 | End: 2021-09-24

## 2021-09-24 RX ORDER — IBUPROFEN 200 MG
600 TABLET ORAL EVERY 6 HOURS
Refills: 0 | Status: DISCONTINUED | OUTPATIENT
Start: 2021-09-24 | End: 2021-09-26

## 2021-09-24 RX ORDER — DIBUCAINE 1 %
1 OINTMENT (GRAM) RECTAL EVERY 6 HOURS
Refills: 0 | Status: DISCONTINUED | OUTPATIENT
Start: 2021-09-24 | End: 2021-09-26

## 2021-09-24 RX ORDER — IBUPROFEN 200 MG
600 TABLET ORAL EVERY 6 HOURS
Refills: 0 | Status: COMPLETED | OUTPATIENT
Start: 2021-09-24 | End: 2022-08-23

## 2021-09-24 RX ORDER — AER TRAVELER 0.5 G/1
1 SOLUTION RECTAL; TOPICAL EVERY 4 HOURS
Refills: 0 | Status: DISCONTINUED | OUTPATIENT
Start: 2021-09-24 | End: 2021-09-26

## 2021-09-24 RX ORDER — MAGNESIUM HYDROXIDE 400 MG/1
30 TABLET, CHEWABLE ORAL
Refills: 0 | Status: DISCONTINUED | OUTPATIENT
Start: 2021-09-24 | End: 2021-09-26

## 2021-09-24 RX ORDER — NIFEDIPINE 30 MG
60 TABLET, EXTENDED RELEASE 24 HR ORAL DAILY
Refills: 0 | Status: DISCONTINUED | OUTPATIENT
Start: 2021-09-24 | End: 2021-09-26

## 2021-09-24 RX ORDER — MAGNESIUM SULFATE 500 MG/ML
2 VIAL (ML) INJECTION
Qty: 40 | Refills: 0 | Status: DISCONTINUED | OUTPATIENT
Start: 2021-09-24 | End: 2021-09-26

## 2021-09-24 RX ORDER — BENZOCAINE 10 %
1 GEL (GRAM) MUCOUS MEMBRANE EVERY 6 HOURS
Refills: 0 | Status: DISCONTINUED | OUTPATIENT
Start: 2021-09-24 | End: 2021-09-26

## 2021-09-24 RX ORDER — SODIUM CHLORIDE 9 MG/ML
3 INJECTION INTRAMUSCULAR; INTRAVENOUS; SUBCUTANEOUS EVERY 8 HOURS
Refills: 0 | Status: DISCONTINUED | OUTPATIENT
Start: 2021-09-24 | End: 2021-09-26

## 2021-09-24 RX ORDER — DIPHENHYDRAMINE HCL 50 MG
25 CAPSULE ORAL EVERY 6 HOURS
Refills: 0 | Status: DISCONTINUED | OUTPATIENT
Start: 2021-09-24 | End: 2021-09-26

## 2021-09-24 RX ORDER — HYDROCORTISONE 1 %
1 OINTMENT (GRAM) TOPICAL EVERY 6 HOURS
Refills: 0 | Status: DISCONTINUED | OUTPATIENT
Start: 2021-09-24 | End: 2021-09-26

## 2021-09-24 RX ORDER — PRAMOXINE HYDROCHLORIDE 150 MG/15G
1 AEROSOL, FOAM RECTAL EVERY 4 HOURS
Refills: 0 | Status: DISCONTINUED | OUTPATIENT
Start: 2021-09-24 | End: 2021-09-26

## 2021-09-24 RX ORDER — LANOLIN
1 OINTMENT (GRAM) TOPICAL EVERY 6 HOURS
Refills: 0 | Status: DISCONTINUED | OUTPATIENT
Start: 2021-09-24 | End: 2021-09-26

## 2021-09-24 RX ORDER — OXYTOCIN 10 UNIT/ML
333.33 VIAL (ML) INJECTION
Qty: 20 | Refills: 0 | Status: DISCONTINUED | OUTPATIENT
Start: 2021-09-24 | End: 2021-09-26

## 2021-09-24 RX ORDER — KETOROLAC TROMETHAMINE 30 MG/ML
30 SYRINGE (ML) INJECTION ONCE
Refills: 0 | Status: DISCONTINUED | OUTPATIENT
Start: 2021-09-24 | End: 2021-09-24

## 2021-09-24 RX ORDER — SIMETHICONE 80 MG/1
80 TABLET, CHEWABLE ORAL EVERY 4 HOURS
Refills: 0 | Status: DISCONTINUED | OUTPATIENT
Start: 2021-09-24 | End: 2021-09-26

## 2021-09-24 RX ORDER — MAGNESIUM SULFATE 500 MG/ML
4 VIAL (ML) INJECTION ONCE
Refills: 0 | Status: COMPLETED | OUTPATIENT
Start: 2021-09-24 | End: 2021-09-24

## 2021-09-24 RX ORDER — TETANUS TOXOID, REDUCED DIPHTHERIA TOXOID AND ACELLULAR PERTUSSIS VACCINE, ADSORBED 5; 2.5; 8; 8; 2.5 [IU]/.5ML; [IU]/.5ML; UG/.5ML; UG/.5ML; UG/.5ML
0.5 SUSPENSION INTRAMUSCULAR ONCE
Refills: 0 | Status: DISCONTINUED | OUTPATIENT
Start: 2021-09-24 | End: 2021-09-26

## 2021-09-24 RX ORDER — ACETAMINOPHEN 500 MG
975 TABLET ORAL
Refills: 0 | Status: DISCONTINUED | OUTPATIENT
Start: 2021-09-24 | End: 2021-09-26

## 2021-09-24 RX ORDER — OXYCODONE HYDROCHLORIDE 5 MG/1
5 TABLET ORAL
Refills: 0 | Status: DISCONTINUED | OUTPATIENT
Start: 2021-09-24 | End: 2021-09-26

## 2021-09-24 RX ADMIN — Medication 1 TABLET(S): at 12:59

## 2021-09-24 RX ADMIN — Medication 5 MILLIGRAM(S): at 10:19

## 2021-09-24 RX ADMIN — Medication 50 GM/HR: at 10:58

## 2021-09-24 RX ADMIN — Medication 30 MILLIGRAM(S): at 08:44

## 2021-09-24 RX ADMIN — Medication 300 GRAM(S): at 10:40

## 2021-09-24 RX ADMIN — SODIUM CHLORIDE 125 MILLILITER(S): 9 INJECTION, SOLUTION INTRAVENOUS at 10:58

## 2021-09-24 RX ADMIN — SODIUM CHLORIDE 3 MILLILITER(S): 9 INJECTION INTRAMUSCULAR; INTRAVENOUS; SUBCUTANEOUS at 12:59

## 2021-09-24 RX ADMIN — Medication 975 MILLIGRAM(S): at 16:00

## 2021-09-24 RX ADMIN — Medication 975 MILLIGRAM(S): at 15:30

## 2021-09-24 RX ADMIN — Medication 60 MILLIGRAM(S): at 09:35

## 2021-09-24 RX ADMIN — Medication 30 MILLIGRAM(S): at 08:14

## 2021-09-24 RX ADMIN — Medication 1000 MILLIUNIT(S)/MIN: at 08:13

## 2021-09-24 RX ADMIN — Medication 1000 MILLIUNIT(S)/MIN: at 10:59

## 2021-09-24 NOTE — PROGRESS NOTE ADULT - SUBJECTIVE AND OBJECTIVE BOX
Patient seen at bedside, for evaluation for FHR deceleration.    T(C): 36.7 (21 @ 02:12), Max: 37.0 (21 @ 19:26)  HR: 97 (21 @ 06:55) (58 - 98)  BP: 176/83 (21 @ 06:55) (124/66 - 176/83)  RR: 18 (21 @ 17:15) (18 - 18)  SpO2: 100% (21 @ 06:45) (100% - 100%)    EFM: FHR before:            Prolonged deceleration for 6 minutes. Patient was sitting up prepping for an Epidural. Pitocin was already discontinued at 0639, O2 already given by facemask, IV hydration.  FHR was recovered to 130 bpm after patient was in supine position  [place]: q 2-3 mins  SVE: 8/80/-1    lactated ringers. 1000 milliLiter(s) IV Continuous <Continuous>  oxytocin Infusion 333.333 milliUNIT(s)/Min IV Continuous <Continuous>  oxytocin Infusion. 2 milliUNIT(s)/Min IV Continuous <Continuous>      A/P: 40y  @ 34.4 weeks, GBS neg, CF carrier  FOB unknown, with preeclampsia without severe features blood pressures elevated intermittently in the severe range, undergoing IOL for gHTN, with category 2 tracing.    -Continue EFM/TOCO  -Continue w/ current resuscitation   -Continue IV hydration  -Pain Management PRN    Dr. Leonardo aware

## 2021-09-24 NOTE — PROGRESS NOTE ADULT - ASSESSMENT
A/P: 41yo P4 s/p , PPD#0, with preeclampsia w/ severe features, s/p hydralazine x1, on mag for seizure prophylaxis, BPs currently well controlled    -Continue magnesium until 24h PP  -Repeat PELs and Mag level at 1700  -Cont to monitor BPs  -Strict I/Os  -continue procardia 60xl  -Pain management prn  - neuro checks q2h  -routine postpartum care    Dr. Dean aware

## 2021-09-24 NOTE — PROGRESS NOTE ADULT - ASSESSMENT
40y  at 38w4d, GBS neg, CF carrier  FOB unknown, with preeclampsia without severe features, undergoing IOL with pitocin, with category 2 tracing doing well    -continue with pitocin as tolerated  -Pain management PRN  -cont efm and toco  -f/u UDS  -vitals [id number]  -IVF hydration, clear liquid diet      Dr. Calles and Dr. Romo aware 40y  at 38w4d, GBS neg, CF carrier  FOB unknown, with preeclampsia without severe features blood pressures elevated intermittently in the severe range, undergoing IOL with pitocin, with category 2 tracing undergoing resuscitation,   -continue resuscitation as needed  -continue with pitocin as tolerated  -Pain management PRN  -cont efm and toco  -f/u UDS  -vitals [id number]  -IVF hydration, clear liquid diet      Dr. Calles and Dr. Romo aware

## 2021-09-24 NOTE — PROGRESS NOTE ADULT - SUBJECTIVE AND OBJECTIVE BOX
PGY2 Note    Patient seen at bedside. No complaints at the moment.    T(F): 98.06 ( @ 02:12), Max: 98.6 ( @ 19:26)  HR: 76 ( @ 06:16)  BP: 165/86 ( @ 06:16) (124/66 - 165/86)  RR: 18 ( @ 17:15)  EFM: 140bpm/moderate variability/+accelerations/occaisional variable decelerations  TOCO: q1-2cm  SVE: 5/90/-2 vtx AROM clear    Medications:  lactated ringers.: 125 ( @ 17:06)  oxytocin Infusion.: 2 ( @ 17:14): now at 14mu/min      Labs:                        13.4   8.20  )-----------( 180      ( 23 Sep 2021 12:20 )             39.9         136  |  105  |  6<L>  ----------------------------<  74  4.1   |  19  |  0.5<L>    Ca    9.5      23 Sep 2021 12:20    TPro  5.9<L>  /  Alb  3.2<L>  /  TBili  0.4  /  DBili  x   /  AST  19  /  ALT  8   /  AlkPhos  125<H>      Prenatal Syphilis Test: Nonreact ( @ 17:25)  Uric Acid, Serum: 4.5 mg/dL ( @ 12:20)  Antibody Screen: NEG (21 @ 12:51)    Urinalysis Basic - ( 23 Sep 2021 12:22 )    Color: Light Yellow / Appearance: Clear / S.012 / pH: x  Gluc: x / Ketone: Negative  / Bili: Negative / Urobili: <2 mg/dL   Blood: x / Protein: Trace / Nitrite: Negative   Leuk Esterase: Negative / RBC: x / WBC x   Sq Epi: x / Non Sq Epi: x / Bacteria: x          urprcr: 0.3 PGY2 Note    Patient seen at bedside. No complaints at the moment. patient currently being resusciated with oxygen, IVF bolus and repositioning    T(F): 98.06 ( @ 02:12), Max: 98.6 ( @ 19:26)  HR: 76 ( @ 06:16)  BP: 165/86 ( @ 06:16) (124/66 - 165/86)  RR: 18 ( @ 17:15)  EFM: 140bpm/moderate variability/+accelerations/occaisional variable decelerations  TOCO: q1-2cm  SVE: 5/90/-2 vtx AROM clear    Medications:  lactated ringers.: 125 ( @ 17:06)  oxytocin Infusion.: 2 ( @ 17:14): now at 14mu/min      Labs:                        13.4   8.20  )-----------( 180      ( 23 Sep 2021 12:20 )             39.9         136  |  105  |  6<L>  ----------------------------<  74  4.1   |  19  |  0.5<L>    Ca    9.5      23 Sep 2021 12:20    TPro  5.9<L>  /  Alb  3.2<L>  /  TBili  0.4  /  DBili  x   /  AST  19  /  ALT  8   /  AlkPhos  125<H>      Prenatal Syphilis Test: Nonreact ( @ 17:25)  Uric Acid, Serum: 4.5 mg/dL ( @ 12:20)  Antibody Screen: NEG (21 @ 12:51)    Urinalysis Basic - ( 23 Sep 2021 12:22 )    Color: Light Yellow / Appearance: Clear / S.012 / pH: x  Gluc: x / Ketone: Negative  / Bili: Negative / Urobili: <2 mg/dL   Blood: x / Protein: Trace / Nitrite: Negative   Leuk Esterase: Negative / RBC: x / WBC x   Sq Epi: x / Non Sq Epi: x / Bacteria: x          urprcr: 0.3

## 2021-09-24 NOTE — PROGRESS NOTE ADULT - SUBJECTIVE AND OBJECTIVE BOX
PGY4 Note    Patient seen at bedside for evaluation, doing well    Vital signs  T(F): 98.06 ( @ 02:12), Max: 98.6 ( @ 19:26)  HR: 78 ( @ 02:40)  BP: 150/91 ( @ 02:40) (124/66 - 158/96)  RR: 18 ( @ 17:15)  EFM: 140/mod/+accels  TOCO:q 2-4min  SVE: 2/60/-3 intact    Medications:  lactated ringers.: 125 ( @ 17:06)  oxytocin Infusion.: 2 ( @ 17:14) at 13mU/min      Labs:                        13.4   8.20  )-----------( 180      ( 23 Sep 2021 12:20 )             39.9         136  |  105  |  6<L>  ----------------------------<  74  4.1   |  19  |  0.5<L>    Ca    9.5      23 Sep 2021 12:20    TPro  5.9<L>  /  Alb  3.2<L>  /  TBili  0.4  /  DBili  x   /  AST  19  /  ALT  8   /  AlkPhos  125<H>      Prenatal Syphilis Test: Nonreact ( @ 17:25)  Uric Acid, Serum: 4.5 mg/dL ( @ 12:20)  Antibody Screen: NEG (21 @ 12:51)  Opos    Urinalysis Basic - ( 23 Sep 2021 12:22 )    Color: Light Yellow / Appearance: Clear / S.012 / pH: x  Gluc: x / Ketone: Negative  / Bili: Negative / Urobili: <2 mg/dL   Blood: x / Protein: Trace / Nitrite: Negative   Leuk Esterase: Negative / RBC: x / WBC x   Sq Epi: x / Non Sq Epi: x / Bacteria: x      Protein/Creatinine Ratio Calculation: 0.3 Ratio (21 @ 12:22)   COVID neg    UDS pending

## 2021-09-24 NOTE — OB PROVIDER DELIVERY SUMMARY - NSSELHIDDEN_OBGYN_ALL_OB_FT
[[email or web address]_FT:[id number]_ALL_OB_FT:[id number]],[[email or web address]_OBGYN_ALL_OB_FT:[id number]]

## 2021-09-24 NOTE — PROGRESS NOTE ADULT - ASSESSMENT
40y  at 38w4d, GBS neg, CF carrier  FOB unknown, with preeclampsia without severe features, undergoing IOL with pitocin    -continue with pitocin as tolerated  -Pain management PRN  -cont efm and toco  -f/u UDS  -vitals q15      Dr. Calles aware

## 2021-09-24 NOTE — OB PROVIDER DELIVERY SUMMARY - NSPROVIDERDELIVERYNOTE_OBGYN_ALL_OB_FT
Patient was fully dilated and pushing. Fetal head was OA and restituted to ROT. The anterior and posterior shoulders delivered, followed by the remaining body atraumatically. The  was handed to the mother. Delayed cord clamping was performed, and then clamped and cut. Cord blood gases collected x2. The placenta delivered intact with membranes. Pitocin was administered. Uterus massaged, fundus found to be firm. Cervix, vagina and perineum inspected no lacerations.    Viable male infant delivered,  with APGARs 9/9      Dr. Akhtar and Dr. Calles were present for the delivery

## 2021-09-24 NOTE — CHART NOTE - NSCHARTNOTEFT_GEN_A_CORE
PGY-3 Note:   Pt seen and examined at bedside for consecutively elevated severe blood pressures. Denies headaches, vision changes, chest pain, shortness of breath, RUQ or epigastric pain.     Vital Signs Last 24 Hrs  T(F): 97.7 (24 Sep 2021 07:04), Max: 98.6 (23 Sep 2021 19:26)  HR: 80 (24 Sep 2021 10:12) (58 - 98)  BP: 165/95 (24 Sep 2021 10:12) (124/66 - 176/83)  RR: 18 (23 Sep 2021 17:15) (18 - 18)    Physical Exam:   GA: AOX3, NAD   Abd: soft, nontender, no RUQ or epigastric tenderness  Neuro: 2+ biceps DTR bilaterally         A/P: 39yo now P4 PPD#0 s/p , now with severely elevated blood pressure meeting criteria for pre-eclampsia with severe features   -s/p IV 5mg hydralazine at 1019  -diet: regular   -start magnesium for seizure prophylaxis   -strict I/Os  -BPs [id number]  -continue ProCardia 60XL daily   -neuro checks q2 horus   -seizure precautions   -continue magnesium for 24 hours for seizure prophylaxis       Dr. Fracisco garcia PGY-3 Note:   Pt seen and examined at bedside for consecutively elevated severe blood pressures. Denies headaches, vision changes, chest pain, shortness of breath, RUQ or epigastric pain.     Vital Signs Last 24 Hrs  T(F): 97.7 (24 Sep 2021 07:04), Max: 98.6 (23 Sep 2021 19:26)  HR: 80 (24 Sep 2021 10:12) (58 - 98)  BP: 165/95 (24 Sep 2021 10:12) (124/66 - 176/83)  RR: 18 (23 Sep 2021 17:15) (18 - 18)    Physical Exam:   GA: AOX3, NAD   Abd: soft, nontender, no RUQ or epigastric tenderness  Neuro: 2+ biceps DTR bilaterally         A/P: 39yo now P4 PPD#0 s/p , now with severely elevated blood pressure meeting criteria for pre-eclampsia with severe features   -s/p IV 5mg hydralazine at 1019  -diet: regular   -start magnesium for seizure prophylaxis   -strict I/Os  -BPs [id number]  -continue ProCardia 60XL daily   -neuro checks q2 horus   -seizure precautions   -continue magnesium until 723 on  for seizure prophylaxis       Dr. Yap aware

## 2021-09-24 NOTE — PROGRESS NOTE ADULT - SUBJECTIVE AND OBJECTIVE BOX
Subjective:   Pt evaluated at bedside for magnesium check. She denies visual disturbances, headache and right upper quadrant pain. Also denies nausea/vomiting/chest pain/epigastric pain/shortness of breath. Pain well controlled.     Objective:   HR: 83 (09-24 @ 22:59)  BP: 125/68 (09-24 @ 22:59) (108/60 - 149/81)  Vital Signs Last 24 Hrs  BP: 125/68 (24 Sep 2021 22:59) (108/60 - 176/83)      09-24 @ 07:01  -  09-24 @ 23:13  --------------------------------------------------------  IN: 2169 mL / OUT: 3200 mL / NET: -1031 mL    UO: (2779-3187) 400, (3908-5707) 350    Gen: NAD, AAOx3  CV: S1S2, RRR, no M/R/G  Pulm: CTAB, no R/R/W  Abd: soft, nontender, no rebound or guarding, no epigastric tenderness, liver nonpalpable +BS.   Fundus: firm, nontender, below the umbilicus  : [place]   Ext: no calf tenderness or edema SCDs in place  Neuro: Reflexes 2+ in bilaterally upper extremities  LOCHIA: minimal lochia    No Known Allergies      Labs:                        11.9   12.12 )-----------( 176      ( 24 Sep 2021 17:00 )             35.2     09-24    136  |  105  |  8<L>  ----------------------------<  173<H>  3.7   |  20  |  0.6<L>    Ca    8.0<L>      24 Sep 2021 17:00  Mg     4.0     09-24    TPro  4.9<L>  /  Alb  2.8<L>  /  TBili  0.3  /  DBili  x   /  AST  22  /  ALT  8   /  AlkPhos  107  09-24    Uric Acid, Serum: 4.0 mg/dL (09-24 @ 17:00)  Magnesium, Serum: 4.0 mg/dL (09-24 @ 17:00)    Medications:  acetaminophen   Tablet .. 975 milliGRAM(s) Oral <User Schedule>  benzocaine 20%/menthol 0.5% Spray 1 Spray(s) Topical every 6 hours PRN  dibucaine 1% Ointment 1 Application(s) Topical every 6 hours PRN  diphenhydrAMINE 25 milliGRAM(s) Oral every 6 hours PRN  diphtheria/tetanus/pertussis (acellular) Vaccine (ADAcel) 0.5 milliLiter(s) IntraMuscular once  hydrocortisone 1% Cream 1 Application(s) Topical every 6 hours PRN  ibuprofen  Tablet. 600 milliGRAM(s) Oral every 6 hours  lactated ringers. 1000 milliLiter(s) IV Continuous <Continuous>  lanolin Ointment 1 Application(s) Topical every 6 hours PRN  magnesium hydroxide Suspension 30 milliLiter(s) Oral two times a day PRN  magnesium sulfate Infusion 2 Gm/Hr IV Continuous <Continuous>  NIFEdipine XL 60 milliGRAM(s) Oral daily  oxyCODONE    IR 5 milliGRAM(s) Oral every 3 hours PRN  oxyCODONE    IR 5 milliGRAM(s) Oral once PRN  oxytocin Infusion 333.333 milliUNIT(s)/Min IV Continuous <Continuous>  oxytocin Infusion 333.333 milliUNIT(s)/Min IV Continuous <Continuous>  oxytocin Infusion. 2 milliUNIT(s)/Min IV Continuous <Continuous>  pramoxine 1%/zinc 5% Cream 1 Application(s) Topical every 4 hours PRN  prenatal multivitamin 1 Tablet(s) Oral daily  simethicone 80 milliGRAM(s) Chew every 4 hours PRN  sodium chloride 0.9% lock flush 3 milliLiter(s) IV Push every 8 hours  witch hazel Pads 1 Application(s) Topical every 4 hours PRN

## 2021-09-24 NOTE — PROGRESS NOTE ADULT - SUBJECTIVE AND OBJECTIVE BOX
Subjective:   Pt evaluated at bedside for magnesium check. Doing well, no acute complaint. sDenies headaches, visual disturbances, CP, SOB, nausea, vomiting, RUQ/epigastric pain, or LE pain or swelling. Pain well controlled.    Objective:   T(F): 97.7 (09-24 @ 07:04), Max: 97.7 (09-24 @ 07:04)  HR: 83 (09-24 @ 16:39)  BP: 119/70 (09-24 @ 16:39) (108/61 - 176/83)  SpO2: 96% (09-24 @ 08:15)      Gen: NAD, AAOx3  CV: RRR  Pulm: CTABL  Abd: soft, non-tender, no rebound or guarding, no epigastric tenderness  Ext: no edema bharath, SCDs in place  Neuro: Reflexes 2+ in bilateral upper extremities    Urine output: (6810-8559) 450cc    Medications:  acetaminophen   Tablet .. 975 milliGRAM(s) Oral <User Schedule>  benzocaine 20%/menthol 0.5% Spray 1 Spray(s) Topical every 6 hours PRN  dibucaine 1% Ointment 1 Application(s) Topical every 6 hours PRN  diphenhydrAMINE 25 milliGRAM(s) Oral every 6 hours PRN  diphtheria/tetanus/pertussis (acellular) Vaccine (ADAcel) 0.5 milliLiter(s) IntraMuscular once  hydrocortisone 1% Cream 1 Application(s) Topical every 6 hours PRN  ibuprofen  Tablet. 600 milliGRAM(s) Oral every 6 hours  lactated ringers. 1000 milliLiter(s) IV Continuous <Continuous>  lanolin Ointment 1 Application(s) Topical every 6 hours PRN  magnesium hydroxide Suspension 30 milliLiter(s) Oral two times a day PRN  magnesium sulfate Infusion 2 Gm/Hr IV Continuous <Continuous>  NIFEdipine XL 60 milliGRAM(s) Oral daily  oxyCODONE    IR 5 milliGRAM(s) Oral every 3 hours PRN  oxyCODONE    IR 5 milliGRAM(s) Oral once PRN  oxytocin Infusion 333.333 milliUNIT(s)/Min IV Continuous <Continuous>  oxytocin Infusion 333.333 milliUNIT(s)/Min IV Continuous <Continuous>  oxytocin Infusion. 2 milliUNIT(s)/Min IV Continuous <Continuous>  pramoxine 1%/zinc 5% Cream 1 Application(s) Topical every 4 hours PRN  prenatal multivitamin 1 Tablet(s) Oral daily  simethicone 80 milliGRAM(s) Chew every 4 hours PRN  sodium chloride 0.9% lock flush 3 milliLiter(s) IV Push every 8 hours  witch hazel Pads 1 Application(s) Topical every 4 hours PRN    No Known Allergies      Labs:                        13.4   8.20  )-----------( 180      ( 23 Sep 2021 12:20 )             39.9     09-23    136  |  105  |  6<L>  ----------------------------<  74  4.1   |  19  |  0.5<L>    Ca    9.5      23 Sep 2021 12:20    TPro  5.9<L>  /  Alb  3.2<L>  /  TBili  0.4  /  DBili  x   /  AST  19  /  ALT  8   /  AlkPhos  125<H>  09-23

## 2021-09-24 NOTE — PROGRESS NOTE ADULT - ASSESSMENT
A/P: 39yo P4 s/p , PPD#0, with preeclampsia w/ severe features, s/p hydralazine x1, on mag for seizure prophylaxis, BPs currently well controlled    -Continue magnesium until 24h PP  -Repeat PELs and Mag level in AM  -Cont to monitor BPs  -Strict I/Os  -continue procardia 60xl  -Pain management prn  - neuro checks q2h  -routine postpartum care    Dr. Dean and Dr. Randolph to be made aware

## 2021-09-25 LAB
ALBUMIN SERPL ELPH-MCNC: 2.8 G/DL — LOW (ref 3.5–5.2)
ALP SERPL-CCNC: 95 U/L — SIGNIFICANT CHANGE UP (ref 30–115)
ALT FLD-CCNC: 8 U/L — SIGNIFICANT CHANGE UP (ref 0–41)
ANION GAP SERPL CALC-SCNC: 11 MMOL/L — SIGNIFICANT CHANGE UP (ref 7–14)
AST SERPL-CCNC: 22 U/L — SIGNIFICANT CHANGE UP (ref 0–41)
BASOPHILS # BLD AUTO: 0.03 K/UL — SIGNIFICANT CHANGE UP (ref 0–0.2)
BASOPHILS NFR BLD AUTO: 0.3 % — SIGNIFICANT CHANGE UP (ref 0–1)
BILIRUB SERPL-MCNC: <0.2 MG/DL — SIGNIFICANT CHANGE UP (ref 0.2–1.2)
BUN SERPL-MCNC: 9 MG/DL — LOW (ref 10–20)
CALCIUM SERPL-MCNC: 7.4 MG/DL — LOW (ref 8.5–10.1)
CHLORIDE SERPL-SCNC: 108 MMOL/L — SIGNIFICANT CHANGE UP (ref 98–110)
CO2 SERPL-SCNC: 18 MMOL/L — SIGNIFICANT CHANGE UP (ref 17–32)
CREAT SERPL-MCNC: 0.5 MG/DL — LOW (ref 0.7–1.5)
EOSINOPHIL # BLD AUTO: 0.1 K/UL — SIGNIFICANT CHANGE UP (ref 0–0.7)
EOSINOPHIL NFR BLD AUTO: 0.9 % — SIGNIFICANT CHANGE UP (ref 0–8)
GLUCOSE SERPL-MCNC: 107 MG/DL — HIGH (ref 70–99)
HCT VFR BLD CALC: 33 % — LOW (ref 37–47)
HGB BLD-MCNC: 11.2 G/DL — LOW (ref 12–16)
IMM GRANULOCYTES NFR BLD AUTO: 0.4 % — HIGH (ref 0.1–0.3)
LDH SERPL L TO P-CCNC: 223 — SIGNIFICANT CHANGE UP (ref 50–242)
LYMPHOCYTES # BLD AUTO: 2.79 K/UL — SIGNIFICANT CHANGE UP (ref 1.2–3.4)
LYMPHOCYTES # BLD AUTO: 25 % — SIGNIFICANT CHANGE UP (ref 20.5–51.1)
MAGNESIUM SERPL-MCNC: 4.1 MG/DL — CRITICAL HIGH (ref 1.8–2.4)
MCHC RBC-ENTMCNC: 29 PG — SIGNIFICANT CHANGE UP (ref 27–31)
MCHC RBC-ENTMCNC: 33.9 G/DL — SIGNIFICANT CHANGE UP (ref 32–37)
MCV RBC AUTO: 85.5 FL — SIGNIFICANT CHANGE UP (ref 81–99)
MONOCYTES # BLD AUTO: 0.91 K/UL — HIGH (ref 0.1–0.6)
MONOCYTES NFR BLD AUTO: 8.1 % — SIGNIFICANT CHANGE UP (ref 1.7–9.3)
NEUTROPHILS # BLD AUTO: 7.3 K/UL — HIGH (ref 1.4–6.5)
NEUTROPHILS NFR BLD AUTO: 65.3 % — SIGNIFICANT CHANGE UP (ref 42.2–75.2)
NRBC # BLD: 0 /100 WBCS — SIGNIFICANT CHANGE UP (ref 0–0)
PLATELET # BLD AUTO: 173 K/UL — SIGNIFICANT CHANGE UP (ref 130–400)
POTASSIUM SERPL-MCNC: 3.9 MMOL/L — SIGNIFICANT CHANGE UP (ref 3.5–5)
POTASSIUM SERPL-SCNC: 3.9 MMOL/L — SIGNIFICANT CHANGE UP (ref 3.5–5)
PROT SERPL-MCNC: 4.8 G/DL — LOW (ref 6–8)
RBC # BLD: 3.86 M/UL — LOW (ref 4.2–5.4)
RBC # FLD: 13.3 % — SIGNIFICANT CHANGE UP (ref 11.5–14.5)
SODIUM SERPL-SCNC: 137 MMOL/L — SIGNIFICANT CHANGE UP (ref 135–146)
URATE SERPL-MCNC: 4 MG/DL — SIGNIFICANT CHANGE UP (ref 2.5–7)
WBC # BLD: 11.18 K/UL — HIGH (ref 4.8–10.8)
WBC # FLD AUTO: 11.18 K/UL — HIGH (ref 4.8–10.8)

## 2021-09-25 RX ADMIN — Medication 1 TABLET(S): at 11:58

## 2021-09-25 RX ADMIN — Medication 60 MILLIGRAM(S): at 07:55

## 2021-09-25 RX ADMIN — Medication 975 MILLIGRAM(S): at 21:34

## 2021-09-25 RX ADMIN — SODIUM CHLORIDE 3 MILLILITER(S): 9 INJECTION INTRAMUSCULAR; INTRAVENOUS; SUBCUTANEOUS at 07:48

## 2021-09-25 RX ADMIN — Medication 600 MILLIGRAM(S): at 09:59

## 2021-09-25 RX ADMIN — SODIUM CHLORIDE 3 MILLILITER(S): 9 INJECTION INTRAMUSCULAR; INTRAVENOUS; SUBCUTANEOUS at 13:53

## 2021-09-25 RX ADMIN — Medication 975 MILLIGRAM(S): at 02:51

## 2021-09-25 RX ADMIN — Medication 600 MILLIGRAM(S): at 07:56

## 2021-09-25 RX ADMIN — SODIUM CHLORIDE 3 MILLILITER(S): 9 INJECTION INTRAMUSCULAR; INTRAVENOUS; SUBCUTANEOUS at 02:24

## 2021-09-25 RX ADMIN — Medication 975 MILLIGRAM(S): at 10:08

## 2021-09-25 NOTE — PROGRESS NOTE ADULT - ASSESSMENT
A/P: 41yo P4 s/p , PPD#1, with preeclampsia w/ severe features, s/p hydralazine x1, on mag for seizure prophylaxis, BPs currently well controlled    -Continue magnesium until 24h PP  -Repeat PELs and Mag level in AM  -Cont to monitor BPs  -Strict I/Os  -continue procardia 60xl  -Pain management prn  - neuro checks q2h  -routine postpartum care    Dr. Dean and Dr. Randolph to be made aware

## 2021-09-25 NOTE — PROGRESS NOTE ADULT - SUBJECTIVE AND OBJECTIVE BOX
Subjective:   Pt evaluated at bedside for magnesium check. She denies visual disturbances, headache and right upper quadrant pain. Also denies nausea/vomiting/chest pain/epigastric pain/shortness of breath. Pain well controlled.     Objective:   HR: 78 (09-25 @ 00:59)  BP: 118/71 (09-25 @ 00:59) (108/60 - 133/78)  RR: 17 (09-24 @ 23:39)      09-24 @ 07:01  -  09-25 @ 01:19  --------------------------------------------------------  IN: 2169 mL / OUT: 3200 mL / NET: -1031 mL    UO: (7239-9241) 400, (5812-2438) 350    Gen: NAD, AAOx3  CV: S1S2, RRR, no M/R/G  Pulm: CTAB, no R/R/W  Abd: soft, nontender, no rebound or guarding, no epigastric tenderness, liver nonpalpable +BS.   Fundus: firm, nontender, below the umbilicus  : [place]   Ext: no calf tenderness or edema SCDs in place  Neuro: Reflexes 2+ in bilaterally upper extremities  LOCHIA: minimal lochia    No Known Allergies      Labs:                        11.9   12.12 )-----------( 176      ( 24 Sep 2021 17:00 )             35.2     09-24    136  |  105  |  8<L>  ----------------------------<  173<H>  3.7   |  20  |  0.6<L>    Ca    8.0<L>      24 Sep 2021 17:00  Mg     4.0     09-24    TPro  4.9<L>  /  Alb  2.8<L>  /  TBili  0.3  /  DBili  x   /  AST  22  /  ALT  8   /  AlkPhos  107  09-24    Uric Acid, Serum: 4.0 mg/dL (09-24 @ 17:00)  Magnesium, Serum: 4.0 mg/dL (09-24 @ 17:00)    Medications:  acetaminophen   Tablet .. 975 milliGRAM(s) Oral <User Schedule>  benzocaine 20%/menthol 0.5% Spray 1 Spray(s) Topical every 6 hours PRN  dibucaine 1% Ointment 1 Application(s) Topical every 6 hours PRN  diphenhydrAMINE 25 milliGRAM(s) Oral every 6 hours PRN  diphtheria/tetanus/pertussis (acellular) Vaccine (ADAcel) 0.5 milliLiter(s) IntraMuscular once  hydrocortisone 1% Cream 1 Application(s) Topical every 6 hours PRN  ibuprofen  Tablet. 600 milliGRAM(s) Oral every 6 hours  lactated ringers. 1000 milliLiter(s) IV Continuous <Continuous>  lanolin Ointment 1 Application(s) Topical every 6 hours PRN  magnesium hydroxide Suspension 30 milliLiter(s) Oral two times a day PRN  magnesium sulfate Infusion 2 Gm/Hr IV Continuous <Continuous>  NIFEdipine XL 60 milliGRAM(s) Oral daily  oxyCODONE    IR 5 milliGRAM(s) Oral every 3 hours PRN  oxyCODONE    IR 5 milliGRAM(s) Oral once PRN  oxytocin Infusion 333.333 milliUNIT(s)/Min IV Continuous <Continuous>  oxytocin Infusion 333.333 milliUNIT(s)/Min IV Continuous <Continuous>  oxytocin Infusion. 2 milliUNIT(s)/Min IV Continuous <Continuous>  pramoxine 1%/zinc 5% Cream 1 Application(s) Topical every 4 hours PRN  prenatal multivitamin 1 Tablet(s) Oral daily  simethicone 80 milliGRAM(s) Chew every 4 hours PRN  sodium chloride 0.9% lock flush 3 milliLiter(s) IV Push every 8 hours  witch hazel Pads 1 Application(s) Topical every 4 hours PRN

## 2021-09-25 NOTE — PROGRESS NOTE ADULT - ASSESSMENT
A/P: 39yo P4 s/p , PPD#1, with preeclampsia w/ severe features, s/p hydralazine x1, on mag for seizure prophylaxis, BPs currently well controlled, reflexes 1+ bilaterally likely 2/2 magnesium  -continue to monitor closely  -Continue magnesium until 24h PP  -Repeat PELs and Mag level in AM, f/u Mg level  -Cont to monitor BPs  -Strict I/Os  -continue procardia 60xl  -Pain management prn  - neuro checks q2h  -routine postpartum care    Dr. Dean and Dr. Randolph to be made aware

## 2021-09-25 NOTE — PROGRESS NOTE ADULT - SUBJECTIVE AND OBJECTIVE BOX
Subjective:   Pt evaluated at bedside for magnesium check. She denies visual disturbances, headache and right upper quadrant pain. Also denies nausea/vomiting/chest pain/epigastric pain/shortness of breath. Pain well controlled.     Objective:   HR: 81 (09-25 @ 04:59)  BP: 118/67 (09-25 @ 04:59) (108/60 - 134/90)  RR: 18 (09-25 @ 03:19)    09-24 @ 07:01  -  09-25 @ 05:07  --------------------------------------------------------  IN: 2419 mL / OUT: 5200 mL / NET: -2781 mL    UO: (1514-7439) 800, (6939-2421) 700 (adequate)    Gen: NAD, AAOx3  CV: S1S2, RRR, no M/R/G  Pulm: CTAB, no R/R/W  Abd: soft, nontender, no rebound or guarding, no epigastric tenderness, liver nonpalpable +BS.   Fundus: firm, nontender, below the umbilicus  Ext: no calf tenderness or edema SCDs in place  Neuro: Reflexes 1+ in bilaterally upper extremities  LOCHIA: minimal lochia        No Known Allergies      Labs:                        11.9   12.12 )-----------( 176      ( 24 Sep 2021 17:00 )             35.2     09-24    136  |  105  |  8<L>  ----------------------------<  173<H>  3.7   |  20  |  0.6<L>    Ca    8.0<L>      24 Sep 2021 17:00  Mg     4.0     09-24    TPro  4.9<L>  /  Alb  2.8<L>  /  TBili  0.3  /  DBili  x   /  AST  22  /  ALT  8   /  AlkPhos  107  09-24    Uric Acid, Serum: 4.0 mg/dL (09-24 @ 17:00)  Magnesium, Serum: 4.0 mg/dL (09-24 @ 17:00)    Medications:  acetaminophen   Tablet .. 975 milliGRAM(s) Oral <User Schedule>  benzocaine 20%/menthol 0.5% Spray 1 Spray(s) Topical every 6 hours PRN  dibucaine 1% Ointment 1 Application(s) Topical every 6 hours PRN  diphenhydrAMINE 25 milliGRAM(s) Oral every 6 hours PRN  diphtheria/tetanus/pertussis (acellular) Vaccine (ADAcel) 0.5 milliLiter(s) IntraMuscular once  hydrocortisone 1% Cream 1 Application(s) Topical every 6 hours PRN  ibuprofen  Tablet. 600 milliGRAM(s) Oral every 6 hours  lactated ringers. 1000 milliLiter(s) IV Continuous <Continuous>  lanolin Ointment 1 Application(s) Topical every 6 hours PRN  magnesium hydroxide Suspension 30 milliLiter(s) Oral two times a day PRN  magnesium sulfate Infusion 2 Gm/Hr IV Continuous <Continuous>  NIFEdipine XL 60 milliGRAM(s) Oral daily  oxyCODONE    IR 5 milliGRAM(s) Oral every 3 hours PRN  oxyCODONE    IR 5 milliGRAM(s) Oral once PRN  oxytocin Infusion 333.333 milliUNIT(s)/Min IV Continuous <Continuous>  oxytocin Infusion 333.333 milliUNIT(s)/Min IV Continuous <Continuous>  oxytocin Infusion. 2 milliUNIT(s)/Min IV Continuous <Continuous>  pramoxine 1%/zinc 5% Cream 1 Application(s) Topical every 4 hours PRN  prenatal multivitamin 1 Tablet(s) Oral daily  simethicone 80 milliGRAM(s) Chew every 4 hours PRN  sodium chloride 0.9% lock flush 3 milliLiter(s) IV Push every 8 hours  witch hazel Pads 1 Application(s) Topical every 4 hours PRN

## 2021-09-26 ENCOUNTER — TRANSCRIPTION ENCOUNTER (OUTPATIENT)
Age: 41
End: 2021-09-26

## 2021-09-26 VITALS
TEMPERATURE: 98 F | RESPIRATION RATE: 18 BRPM | DIASTOLIC BLOOD PRESSURE: 78 MMHG | HEART RATE: 94 BPM | SYSTOLIC BLOOD PRESSURE: 140 MMHG

## 2021-09-26 PROCEDURE — 99238 HOSP IP/OBS DSCHRG MGMT 30/<: CPT

## 2021-09-26 RX ORDER — ACETAMINOPHEN 500 MG
3 TABLET ORAL
Qty: 0 | Refills: 0 | DISCHARGE
Start: 2021-09-26

## 2021-09-26 RX ORDER — IBUPROFEN 200 MG
1 TABLET ORAL
Qty: 0 | Refills: 0 | DISCHARGE
Start: 2021-09-26

## 2021-09-26 RX ORDER — NIFEDIPINE 30 MG
1 TABLET, EXTENDED RELEASE 24 HR ORAL
Qty: 30 | Refills: 2
Start: 2021-09-26 | End: 2021-12-24

## 2021-09-26 RX ADMIN — Medication 600 MILLIGRAM(S): at 00:28

## 2021-09-26 RX ADMIN — Medication 600 MILLIGRAM(S): at 06:36

## 2021-09-26 RX ADMIN — Medication 1 TABLET(S): at 13:04

## 2021-09-26 RX ADMIN — Medication 60 MILLIGRAM(S): at 06:44

## 2021-09-26 NOTE — DISCHARGE NOTE OB - HOSPITAL COURSE
41yo P4 s/p , PPD#2, with preeclampsia w/ severe features, s/p hydralazine x1, s/p mag for seizure prophylaxis, BPs in the nonsevere ranges, recovering well

## 2021-09-26 NOTE — DISCHARGE NOTE OB - PATIENT PORTAL LINK FT
You can access the FollowMyHealth Patient Portal offered by [facility] by registering at the following website: [email or web address]. By joining [employer]’s FollowMyHealth portal, you will also be able to view your health information using other applications (apps) compatible with our system.

## 2021-09-26 NOTE — DISCHARGE NOTE OB - CARE PROVIDER_API CALL
Kristina Campos)  Obstetrics and Gynecology  [address] [place] [address]  Phone: [phone]  Fax: [phone]  Established Patient  Follow Up Time:

## 2021-09-26 NOTE — DISCHARGE NOTE OB - PLAN OF CARE
No heavy lifting.   Nothing inside the vagina for 6 weeks: no tampons, douching, sexual intercourse, tub baths or pools.   If you have a fever over 100.4F, severe abdominal pain or heavy vaginal bleeding please call your doctor or go to the emergency room.    Please follow up with visiting nurse services for at home BP monitoring. If you have symptoms of preeclampsia, like we discussed, please get medical attention.   Please follow up with your OBGYN in 1 week for blood pressure check and 6 weeks for a postpartum visit.

## 2021-09-26 NOTE — PROGRESS NOTE ADULT - ASSESSMENT
A/P: 39yo P4 s/p , PPD#2, with preeclampsia w/ severe features, s/p hydralazine x1, s/p mag for seizure prophylaxis, BPs in the nonsevere ranges, recovering well    -Cont to monitor BPs, last /84 and asymptomatic  -continue procardia 60xl  -Pain management prn with PO medication  -ambulation/PO hydration  -regular diet as tolerated  -routine postpartum care  -anticipate d/c home today with VNS    Dr. Jones and Dr. Calles to be made aware

## 2021-09-26 NOTE — DISCHARGE NOTE OB - CARE PLAN
1 Principal Discharge DX:	Vaginal delivery  Assessment and plan of treatment:	No heavy lifting.   Nothing inside the vagina for 6 weeks: no tampons, douching, sexual intercourse, tub baths or pools.   If you have a fever over 100.4F, severe abdominal pain or heavy vaginal bleeding please call your doctor or go to the emergency room.    Please follow up with visiting nurse services for at home BP monitoring. If you have symptoms of preeclampsia, like we discussed, please get medical attention.   Please follow up with your OBGYN in 1 week for blood pressure check and 6 weeks for a postpartum visit.

## 2021-09-26 NOTE — DISCHARGE NOTE NURSING/CASE MANAGEMENT/SOCIAL WORK - PATIENT PORTAL LINK FT
You can access the FollowMyHealth Patient Portal offered by [facility] by registering at the following website: [email or web address]. By joining [employer]’s FollowMyHealth portal, you will also be able to view your health information using other applications (apps) compatible with our system.

## 2021-09-26 NOTE — PROGRESS NOTE ADULT - SUBJECTIVE AND OBJECTIVE BOX
PGY1 Note:    Pt evaluated at bedside for magnesium check. She denies visual disturbances, headache and right upper quadrant pain. Also denies nausea/vomiting/chest pain/epigastric pain/shortness of breath. Pain well controlled.     Physical Exam  Vital Signs Last 24 Hrs  T(C): 35.9 (26 Sep 2021 07:59), Max: 36.7 (25 Sep 2021 15:59)  T(F): 96.6 (26 Sep 2021 07:59), Max: 98.1 (25 Sep 2021 15:59)  HR: 81 (26 Sep 2021 07:59) (70 - 88)  BP: 148/84 (26 Sep 2021 07:59) (132/77 - 148/84)  RR: 18 (26 Sep 2021 07:59) (18 - 18)    Gen: NAD, AAOx3  CV: S1S2, RRR, no M/R/G  Pulm: CTAB  Fundus: firm, below umbilicus   Abd: Soft, nontender, nondistended  Lochia: minimal   Ext: No calf tenderness, no swelling    Labs:                        11.2   11.18 )-----------( 173      ( 25 Sep 2021 04:30 )             33.0                         11.9   12.12 )-----------( 176      ( 24 Sep 2021 17:00 )             35.2        MEDICATIONS  (STANDING):  acetaminophen   Tablet .. 975 milliGRAM(s) Oral <User Schedule>  diphtheria/tetanus/pertussis (acellular) Vaccine (ADAcel) 0.5 milliLiter(s) IntraMuscular once  ibuprofen  Tablet. 600 milliGRAM(s) Oral every 6 hours  magnesium sulfate Infusion 2 Gm/Hr (50 mL/Hr) IV Continuous <Continuous>  NIFEdipine XL 60 milliGRAM(s) Oral daily  oxytocin Infusion 333.333 milliUNIT(s)/Min (1000 mL/Hr) IV Continuous <Continuous>  oxytocin Infusion 333.333 milliUNIT(s)/Min (1000 mL/Hr) IV Continuous <Continuous>  oxytocin Infusion. 2 milliUNIT(s)/Min (2 mL/Hr) IV Continuous <Continuous>  prenatal multivitamin 1 Tablet(s) Oral daily  sodium chloride 0.9% lock flush 3 milliLiter(s) IV Push every 8 hours    MEDICATIONS  (PRN):  benzocaine 20%/menthol 0.5% Spray 1 Spray(s) Topical every 6 hours PRN for Perineal discomfort  dibucaine 1% Ointment 1 Application(s) Topical every 6 hours PRN Perineal discomfort  diphenhydrAMINE 25 milliGRAM(s) Oral every 6 hours PRN Pruritus  hydrocortisone 1% Cream 1 Application(s) Topical every 6 hours PRN Moderate Pain (4-6)  lanolin Ointment 1 Application(s) Topical every 6 hours PRN nipple soreness  magnesium hydroxide Suspension 30 milliLiter(s) Oral two times a day PRN Constipation  oxyCODONE    IR 5 milliGRAM(s) Oral every 3 hours PRN Moderate to Severe Pain (4-10)  oxyCODONE    IR 5 milliGRAM(s) Oral once PRN Severe Pain (7 - 10)  pramoxine 1%/zinc 5% Cream 1 Application(s) Topical every 4 hours PRN Moderate Pain (4-6)  simethicone 80 milliGRAM(s) Chew every 4 hours PRN Gas  witch hazel Pads 1 Application(s) Topical every 4 hours PRN Perineal discomfort

## 2021-09-28 ENCOUNTER — APPOINTMENT (OUTPATIENT)
Dept: ANTEPARTUM | Facility: CLINIC | Age: 41
End: 2021-09-28

## 2021-09-29 DIAGNOSIS — Z14.1 CYSTIC FIBROSIS CARRIER: ICD-10-CM

## 2021-09-29 DIAGNOSIS — Z28.09 IMMUNIZATION NOT CARRIED OUT BECAUSE OF OTHER CONTRAINDICATION: ICD-10-CM

## 2021-09-29 DIAGNOSIS — Z3A.38 38 WEEKS GESTATION OF PREGNANCY: ICD-10-CM

## 2021-09-29 DIAGNOSIS — Z28.21 IMMUNIZATION NOT CARRIED OUT BECAUSE OF PATIENT REFUSAL: ICD-10-CM

## 2021-09-30 ENCOUNTER — OUTPATIENT (OUTPATIENT)
Dept: OUTPATIENT SERVICES | Facility: HOSPITAL | Age: 41
LOS: 1 days | Discharge: HOME | End: 2021-09-30

## 2021-09-30 ENCOUNTER — APPOINTMENT (OUTPATIENT)
Dept: OBGYN | Facility: CLINIC | Age: 41
End: 2021-09-30
Payer: MEDICAID

## 2021-09-30 VITALS
DIASTOLIC BLOOD PRESSURE: 73 MMHG | BODY MASS INDEX: 30.48 KG/M2 | WEIGHT: 172 LBS | SYSTOLIC BLOOD PRESSURE: 129 MMHG | HEIGHT: 63 IN

## 2021-09-30 DIAGNOSIS — O09.529 SUPERVISION OF ELDERLY MULTIGRAVIDA, UNSPECIFIED TRIMESTER: ICD-10-CM

## 2021-09-30 PROBLEM — Z78.9 OTHER SPECIFIED HEALTH STATUS: Chronic | Status: ACTIVE | Noted: 2021-09-23

## 2021-09-30 PROCEDURE — 99213 OFFICE O/P EST LOW 20 MIN: CPT

## 2021-09-30 NOTE — HISTORY OF PRESENT ILLNESS
[Postpartum Follow Up] : postpartum follow up [Complications:___] : complications include: [unfilled] [Delivery Date: ___] : on [unfilled] [] : delivered by vaginal delivery [Male] : Delivery History: baby boy [Wt. ___] : weighing [unfilled] [Breastfeeding] : currently nursing [None] : The patient is currently asymptomatic [Doing Well] : is doing well [No Sign of Infection] : is showing no signs of infection [[id number]] : Pt s/p induction and  for preeclampsia with severe features. Pt d/cd home with procardia xl 60mg. Pt here for bp check [de-identified] : BPs well controlled.  [de-identified] : RV in 5 weeks or prn

## 2021-10-05 ENCOUNTER — APPOINTMENT (OUTPATIENT)
Dept: ANTEPARTUM | Facility: CLINIC | Age: 41
End: 2021-10-05

## 2021-10-07 ENCOUNTER — NON-APPOINTMENT (OUTPATIENT)
Age: 41
End: 2021-10-07

## 2021-10-12 ENCOUNTER — APPOINTMENT (OUTPATIENT)
Dept: ANTEPARTUM | Facility: CLINIC | Age: 41
End: 2021-10-12

## 2021-10-12 DIAGNOSIS — Z00.00 ENCOUNTER FOR GENERAL ADULT MEDICAL EXAMINATION W/OUT ABNORMAL FINDINGS: ICD-10-CM

## 2021-10-15 LAB
M TB IFN-G BLD-IMP: NEGATIVE
QUANTIFERON TB PLUS MITOGEN MINUS NIL: 8.93 IU/ML
QUANTIFERON TB PLUS NIL: 0.1 IU/ML
QUANTIFERON TB PLUS TB1 MINUS NIL: 0.07 IU/ML
QUANTIFERON TB PLUS TB2 MINUS NIL: 0.03 IU/ML

## 2021-11-11 ENCOUNTER — OUTPATIENT (OUTPATIENT)
Dept: OUTPATIENT SERVICES | Facility: HOSPITAL | Age: 41
LOS: 1 days | Discharge: HOME | End: 2021-11-11

## 2021-11-11 ENCOUNTER — APPOINTMENT (OUTPATIENT)
Dept: OBGYN | Facility: CLINIC | Age: 41
End: 2021-11-11
Payer: MEDICAID

## 2021-11-11 VITALS
WEIGHT: 170 LBS | DIASTOLIC BLOOD PRESSURE: 70 MMHG | BODY MASS INDEX: 30.12 KG/M2 | HEIGHT: 63 IN | SYSTOLIC BLOOD PRESSURE: 120 MMHG

## 2021-11-11 DIAGNOSIS — Z01.30 ENCOUNTER FOR EXAMINATION OF BLOOD PRESSURE W/OUT ABNORMAL FINDINGS: ICD-10-CM

## 2021-11-11 RX ORDER — NORETHINDRONE ACETATE AND ETHINYL ESTRADIOL AND FERROUS FUMARATE 1.5-30(21)
1.5-3 KIT ORAL DAILY
Qty: 1 | Refills: 5 | Status: DISCONTINUED | COMMUNITY
Start: 2021-11-11 | End: 2021-11-11

## 2021-11-11 RX ORDER — NORETHINDRONE ACETATE AND ETHINYL ESTRADIOL AND FERROUS FUMARATE 1MG-20(21)
1-20 KIT ORAL DAILY
Qty: 28 | Refills: 6 | Status: ACTIVE | COMMUNITY
Start: 2021-11-11 | End: 1900-01-01

## 2021-11-11 NOTE — HISTORY OF PRESENT ILLNESS
[Postpartum Follow Up] : postpartum follow up [Complications:___] : complications include: [unfilled] [Delivery Date: ___] : on [unfilled] [] : delivered by vaginal delivery [Male] : Delivery History: baby boy [Wt. ___] : weighing [unfilled] [Vaginal Delivery] : vaginal delivery [Pre-Eclampsia] : pre-eclampsia [Delivery Date Was ___] : delivery date was [unfilled] [Breastfeeding] : not currently nursing [Back to Normal] : is back to normal in size [None] : no vaginal bleeding [Normal] : the vagina was normal [Hematoma] : no vaginal hematoma [Abscess Formation] : no vaginal abscess [Not Done] : Examination of breasts not done [Doing Well] : is doing well [No Sign of Infection] : is showing no signs of infection [de-identified] : OCPS sent to pharmacy. RV in 6 months or prn

## 2022-05-16 NOTE — OB RN TRIAGE NOTE - NS_FETALHEARTHEAR_OBGYN_ALL_OB
Recent COVID infection last week  States that she is still having some abdominal pain that is improving  Was also on steroid for headache that has already improved  Will try Nexium for stomach discomfort  Yes

## 2023-03-06 ENCOUNTER — APPOINTMENT (OUTPATIENT)
Dept: OBGYN | Facility: CLINIC | Age: 43
End: 2023-03-06

## 2024-02-06 ENCOUNTER — EMERGENCY (EMERGENCY)
Facility: HOSPITAL | Age: 44
LOS: 0 days | Discharge: HOME | End: 2024-02-06
Attending: EMERGENCY MEDICINE
Payer: MEDICAID

## 2024-02-06 VITALS
SYSTOLIC BLOOD PRESSURE: 150 MMHG | DIASTOLIC BLOOD PRESSURE: 81 MMHG | WEIGHT: 156.09 LBS | OXYGEN SATURATION: 100 % | HEART RATE: 73 BPM | RESPIRATION RATE: 16 BRPM | TEMPERATURE: 98 F

## 2024-02-06 PROCEDURE — 99284 EMERGENCY DEPT VISIT MOD MDM: CPT

## 2024-02-06 PROCEDURE — 99283 EMERGENCY DEPT VISIT LOW MDM: CPT

## 2024-02-06 RX ORDER — IBUPROFEN 200 MG
600 TABLET ORAL ONCE
Refills: 0 | Status: COMPLETED | OUTPATIENT
Start: 2024-02-06 | End: 2024-02-06

## 2024-02-06 RX ORDER — AMOXICILLIN 250 MG/5ML
1 SUSPENSION, RECONSTITUTED, ORAL (ML) ORAL
Qty: 14 | Refills: 0
Start: 2024-02-06 | End: 2024-02-12

## 2024-02-06 RX ADMIN — Medication 600 MILLIGRAM(S): at 18:15

## 2024-02-06 NOTE — ED PROVIDER NOTE - PATIENT PORTAL LINK FT
You can access the FollowMyHealth Patient Portal offered by [facility] by registering at the following website: [email or web address]. By joining [employer]’s FollowMyHealth portal, you will also be able to view your health information using other applications (apps) compatible with our system.

## 2024-02-06 NOTE — ED PROVIDER NOTE - PHYSICAL EXAMINATION
VITAL SIGNS: noted  CONSTITUTIONAL: Well-developed; well-nourished; in no acute distress  HEAD: Normocephalic; atraumatic  EYES: PERRL, EOM intact; conjunctiva and sclera clear  ENT: No nasal discharge; MMM, oropharynx clear without tonsillar hypertrophy or exudates, multiple missing teeth. + ttp to tooth #6 with discoloration. no loose teeth. no gingival swelling or abscess noted. uvula midline no pta.    NECK: Supple; non tender. No anterior cervical lymphadenopathy noted  CARD: S1, S2 normal; no murmurs, gallops, or rubs. Regular rate and rhythm  RESP: CTAB/L, no wheezes, rales or rhonchi  ABD: Normal bowel sounds; soft; non-distended; non-tender; no organoomegaly. No CVA tenderness  EXT: Normal ROM. No calf tenderness or edema. Distal pulses intact  NEURO: Awake and alert, oriented. Grossly unremarkable. No focal deficits.  SKIN: Skin exam is warm and dry, no acute rash

## 2024-02-06 NOTE — ED PROVIDER NOTE - ATTENDING CONTRIBUTION TO CARE
43-year-old female with discomfort in tooth #6 and mobility of # 7. Patient states she supposed to get a root canal done, insurance does not cover it and she cannot afford it done. Denies any difficulty swallowing or breathing, no fever or chills, no facial swelling. Patient appears well in no acute distress, there is mobility of tooth #7 which is artificial, no palpable abscess, normal phonation. Dose of analgesia given in ED, prescription  for antibiotic sent to patient's pharmacy, she was advised to follow-up with her dentist or dental clinic. Strict return precautions given. Patient verbalized understanding is amenable to DC plan. Prior records were reviewed. Vital signs reviewed and are normal.

## 2024-02-06 NOTE — ED PROVIDER NOTE - NSFOLLOWUPCLINICS_GEN_ALL_ED_FT
[facility]  Dental  [address] [place] [id number]  Phone: [phone]  Fax:   Follow Up Time: 1-3 Days

## 2024-02-06 NOTE — ED PROVIDER NOTE - NSFOLLOWUPINSTRUCTIONS_ED_ALL_ED_FT
Dental Pain    Dental pain is often a sign that something is wrong with your teeth or gums. You can also have pain after a dental treatment. If you have dental pain, it is important to contact your dentist, especially if the cause of the pain is not known. Dental pain may hurt a lot or a little and can be caused by many things, including:  Tooth decay (cavities or caries).  Infection.  The inner part of the tooth being filled with pus (an abscess).  Injury.  A crack in the tooth.  Gums that move back and expose the root of a tooth.  Gum disease.  Abnormal grinding or clenching of teeth.  Not taking good care of your teeth.  Sometimes the cause of pain is not known.    You may have pain all the time, or it may happen only when you are:  Chewing.  Exposed to hot or cold temperatures.  Eating or drinking foods or drinks that have a lot of sugar in them, such as soda or candy.  Follow these instructions at home:  Medicines    Take over-the-counter and prescription medicines only as told by your dentist.  If you were prescribed an antibiotic medicine, take it as told by your dentist. Do not stop taking it even if you start to feel better.  Eating and drinking    Do not eat foods or drinks that cause you pain. These include:  Very hot or very cold foods or drinks.  Sweet or sugary foods or drinks.  Managing pain and swelling      If told, put ice on the painful area of your face. To do this:  Put ice in a plastic bag.  Place a towel between your skin and the bag.  Leave the ice on for 20 minutes, 2–3 times a day.  Take off the ice if your skin turns bright red. This is very important. If you cannot feel pain, heat, or cold, you have a greater risk of damage to the area.  Brushing your teeth    Brush your teeth twice a day using a fluoride toothpaste.  Use a toothpaste made for sensitive teeth as told by your dentist.  Use a soft toothbrush.  General instructions    Floss your teeth at least once a day.  Do not put heat on the outside of your face.  Rinse your mouth often with salt water. To make salt water, dissolve ½–1 tsp (3–6 g) of salt in 1 cup (237 mL) of warm water.  Watch your dental pain. Let your dentist know if there are any changes.  Keep all follow-up visits.  Contact a dentist if:  You have dental pain and you do not know why.  Medicine does not help your pain.  Your symptoms get worse.  You have new symptoms.  Get help right away if:  You cannot open your mouth.  You are having trouble breathing or swallowing.  You have a fever.  Your face, neck, or jaw is swollen.  These symptoms may be an emergency. Get help right away. Call your local emergency services (911 in the [place]  Do not wait to see if the symptoms will go away.  Do not drive yourself to the hospital.  Summary  Dental pain may be caused by many things, including tooth decay, injury, or infection. In some cases, the cause is not known.  Dental pain may hurt a lot or very little. You may have pain all the time, or you may have it only when you eat or drink.  Take over-the-counter and prescription medicines only as told by your dentist.  Watch your dental pain for any changes. Let your dentist know if symptoms get worse.  This information is not intended to replace advice given to you by your health care provider. Make sure you discuss any questions you have with your health care provider.

## 2024-02-06 NOTE — ED PROVIDER NOTE - OBJECTIVE STATEMENT
Pt is a 43 y.o Female with no significant PMHx who presents to the ED with chief complaint of dental pain. Pt states she has been experiencing right upper sided dental pain to multiple teeth. Pt states her pain started two weeks ago but improved and then came back for the last two days. Denies any trauma, fever, chills, drooling, trismus, sore throat, gum bleeding or discharge, CP, SOB, abdominal pain, n/v. Pt states she last saw a dentist 8 months ago and was suppose to follow up afterwards for root canal but states she could not find anyone who would take her insurance. Pt states she feels her front right tooth may be a little loose. Pt took Tylenol for pain last night. Denies taking anything for pain PTA.

## 2024-02-09 DIAGNOSIS — K08.89 OTHER SPECIFIED DISORDERS OF TEETH AND SUPPORTING STRUCTURES: ICD-10-CM
